# Patient Record
Sex: FEMALE | Race: WHITE | NOT HISPANIC OR LATINO | URBAN - METROPOLITAN AREA
[De-identification: names, ages, dates, MRNs, and addresses within clinical notes are randomized per-mention and may not be internally consistent; named-entity substitution may affect disease eponyms.]

---

## 2017-04-18 ENCOUNTER — OFFICE VISIT - RIVER FALLS (OUTPATIENT)
Dept: FAMILY MEDICINE | Facility: CLINIC | Age: 22
End: 2017-04-18

## 2017-04-20 ENCOUNTER — OFFICE VISIT - RIVER FALLS (OUTPATIENT)
Dept: FAMILY MEDICINE | Facility: CLINIC | Age: 22
End: 2017-04-20

## 2017-09-13 ENCOUNTER — OFFICE VISIT - RIVER FALLS (OUTPATIENT)
Dept: FAMILY MEDICINE | Facility: CLINIC | Age: 22
End: 2017-09-13

## 2017-09-13 ASSESSMENT — MIFFLIN-ST. JEOR: SCORE: 1286.87

## 2017-09-14 LAB
CREAT SERPL-MCNC: 0.62 MG/DL (ref 0.5–1.1)
GLUCOSE BLD-MCNC: 84 MG/DL (ref 65–99)

## 2017-11-29 ENCOUNTER — OFFICE VISIT - RIVER FALLS (OUTPATIENT)
Dept: FAMILY MEDICINE | Facility: CLINIC | Age: 22
End: 2017-11-29

## 2017-11-29 ASSESSMENT — MIFFLIN-ST. JEOR: SCORE: 1292.31

## 2017-12-12 ENCOUNTER — OFFICE VISIT - RIVER FALLS (OUTPATIENT)
Dept: FAMILY MEDICINE | Facility: CLINIC | Age: 22
End: 2017-12-12

## 2017-12-27 ENCOUNTER — OFFICE VISIT - RIVER FALLS (OUTPATIENT)
Dept: FAMILY MEDICINE | Facility: CLINIC | Age: 22
End: 2017-12-27

## 2017-12-27 ASSESSMENT — MIFFLIN-ST. JEOR: SCORE: 1299.57

## 2018-01-18 ENCOUNTER — OFFICE VISIT - RIVER FALLS (OUTPATIENT)
Dept: FAMILY MEDICINE | Facility: CLINIC | Age: 23
End: 2018-01-18

## 2018-01-18 ASSESSMENT — MIFFLIN-ST. JEOR: SCORE: 1322.25

## 2018-01-19 ENCOUNTER — OFFICE VISIT - RIVER FALLS (OUTPATIENT)
Dept: FAMILY MEDICINE | Facility: CLINIC | Age: 23
End: 2018-01-19

## 2018-01-19 ASSESSMENT — MIFFLIN-ST. JEOR: SCORE: 1322.25

## 2020-09-20 ENCOUNTER — HOSPITAL ENCOUNTER (EMERGENCY)
Facility: CLINIC | Age: 25
Discharge: HOME | End: 2020-09-20
Attending: FAMILY MEDICINE | Admitting: FAMILY MEDICINE
Payer: COMMERCIAL

## 2020-09-20 ENCOUNTER — TELEPHONE (OUTPATIENT)
Dept: BEHAVIORAL HEALTH | Facility: CLINIC | Age: 25
End: 2020-09-20

## 2020-09-20 VITALS
SYSTOLIC BLOOD PRESSURE: 126 MMHG | RESPIRATION RATE: 16 BRPM | TEMPERATURE: 97.8 F | DIASTOLIC BLOOD PRESSURE: 88 MMHG | HEART RATE: 73 BPM | OXYGEN SATURATION: 95 %

## 2020-09-20 DIAGNOSIS — F42.9 OBSESSIVE-COMPULSIVE DISORDER, UNSPECIFIED TYPE: ICD-10-CM

## 2020-09-20 DIAGNOSIS — F32.A DEPRESSION, UNSPECIFIED DEPRESSION TYPE: ICD-10-CM

## 2020-09-20 DIAGNOSIS — R45.851 SUICIDAL IDEATION: ICD-10-CM

## 2020-09-20 LAB
ALCOHOL BREATH TEST: 0.04 (ref 0–0.01)
AMPHETAMINES UR QL SCN: NEGATIVE
BARBITURATES UR QL: NEGATIVE
BENZODIAZ UR QL: NEGATIVE
CANNABINOIDS UR QL SCN: NEGATIVE
COCAINE UR QL: NEGATIVE
ETHANOL UR QL SCN: POSITIVE
HCG UR QL: NEGATIVE
LABORATORY COMMENT REPORT: NORMAL
OPIATES UR QL SCN: NEGATIVE
SARS-COV-2 RNA SPEC QL NAA+PROBE: NEGATIVE
SARS-COV-2 RNA SPEC QL NAA+PROBE: NORMAL
SPECIMEN SOURCE: NORMAL
SPECIMEN SOURCE: NORMAL

## 2020-09-20 PROCEDURE — C9803 HOPD COVID-19 SPEC COLLECT: HCPCS | Performed by: FAMILY MEDICINE

## 2020-09-20 PROCEDURE — 90791 PSYCH DIAGNOSTIC EVALUATION: CPT

## 2020-09-20 PROCEDURE — 80307 DRUG TEST PRSMV CHEM ANLYZR: CPT | Performed by: FAMILY MEDICINE

## 2020-09-20 PROCEDURE — 81025 URINE PREGNANCY TEST: CPT | Performed by: FAMILY MEDICINE

## 2020-09-20 PROCEDURE — U0003 INFECTIOUS AGENT DETECTION BY NUCLEIC ACID (DNA OR RNA); SEVERE ACUTE RESPIRATORY SYNDROME CORONAVIRUS 2 (SARS-COV-2) (CORONAVIRUS DISEASE [COVID-19]), AMPLIFIED PROBE TECHNIQUE, MAKING USE OF HIGH THROUGHPUT TECHNOLOGIES AS DESCRIBED BY CMS-2020-01-R: HCPCS | Performed by: FAMILY MEDICINE

## 2020-09-20 PROCEDURE — 99285 EMERGENCY DEPT VISIT HI MDM: CPT | Mod: Z6 | Performed by: FAMILY MEDICINE

## 2020-09-20 PROCEDURE — 80320 DRUG SCREEN QUANTALCOHOLS: CPT | Performed by: FAMILY MEDICINE

## 2020-09-20 PROCEDURE — 99285 EMERGENCY DEPT VISIT HI MDM: CPT | Mod: 25 | Performed by: FAMILY MEDICINE

## 2020-09-20 PROCEDURE — 25000128 H RX IP 250 OP 636: Performed by: FAMILY MEDICINE

## 2020-09-20 RX ORDER — ONDANSETRON 4 MG/1
4 TABLET, ORALLY DISINTEGRATING ORAL ONCE
Status: COMPLETED | OUTPATIENT
Start: 2020-09-20 | End: 2020-09-20

## 2020-09-20 RX ORDER — NORGESTIMATE AND ETHINYL ESTRADIOL 0.25-0.035
1 KIT ORAL DAILY
COMMUNITY
Start: 2020-08-31 | End: 2021-08-07

## 2020-09-20 RX ORDER — FLUOXETINE 40 MG/1
40 CAPSULE ORAL DAILY
COMMUNITY
Start: 2018-10-08 | End: 2021-08-07

## 2020-09-20 RX ORDER — GABAPENTIN 300 MG/1
300 CAPSULE ORAL
COMMUNITY
Start: 2018-10-08 | End: 2021-08-07

## 2020-09-20 RX ADMIN — ONDANSETRON 4 MG: 4 TABLET, ORALLY DISINTEGRATING ORAL at 14:07

## 2020-09-20 ASSESSMENT — ENCOUNTER SYMPTOMS
NERVOUS/ANXIOUS: 1
APPETITE CHANGE: 1
PALPITATIONS: 1

## 2020-09-20 NOTE — ED PROVIDER NOTES
ED Provider Note  [facility]      History     Chief Complaint   Patient presents with     Anxiety     Obsessive Compulsive Disorder     The history is provided by the patient, medical records and the spouse.     Yolie Ware is a 24 year old female with a history of OCD who presents to the Emergency Department with anxiety. Patient was diagnosed with OCD 3 years ago with obsessions around getting sick. COVID-19 has now increased her OCD symptoms. She has obsessive thinking and worry related to COVID. The beginning of flu season has further escalated these symptoms. Patient has racing, ruminating thoughts, decreased appetite, increased heart rate. Patient also reports suicidal ideation but denies intent. She states she is fearful to go home because she is unsure what she will do. She states there are guns in the home. Patient reports alcohol use to medicate symptoms.     Past Medical History  History reviewed. No pertinent past medical history.  History reviewed. No pertinent surgical history.  FLUoxetine (PROZAC) 40 MG capsule  gabapentin (NEURONTIN) 300 MG capsule  norgestimate-ethinyl estradiol (ORTHO-CYCLEN) 0.25-35 MG-MCG tablet      No Known Allergies  Family History  No family history on file.  Social History   Social History     Tobacco Use     Smoking status: Never Smoker     Smokeless tobacco: Never Used   Substance Use Topics     Alcohol use: Yes     Comment: rarely     Drug use: Not Currently      Past medical history, past surgical history, medications, allergies, family history, and social history were reviewed with the patient. No additional pertinent items.       Review of Systems   Constitutional: Positive for appetite change (decreased).   Cardiovascular: Positive for palpitations.   Psychiatric/Behavioral: Positive for suicidal ideas. The patient is nervous/anxious.    All other systems reviewed and are negative.    A complete review of systems was performed with  pertinent positives and negatives noted in the HPI, and all other systems negative.    Physical Exam   BP: 123/85  Pulse: 78  Temp: 96.7  F (35.9  C)  Resp: 16  SpO2: 98 %  Physical Exam  Constitutional:       General: She is not in acute distress.     Appearance: She is not diaphoretic.   HENT:      Head: Atraumatic.      Mouth/Throat:      Pharynx: No oropharyngeal exudate.   Eyes:      General: No scleral icterus.     Pupils: Pupils are equal, round, and reactive to light.   Cardiovascular:      Heart sounds: Normal heart sounds.   Pulmonary:      Effort: No respiratory distress.      Breath sounds: Normal breath sounds.   Abdominal:      General: Bowel sounds are normal.      Palpations: Abdomen is soft.      Tenderness: There is no abdominal tenderness.   Musculoskeletal:         General: No tenderness.   Skin:     General: Skin is warm.      Findings: No rash.   Neurological:      General: No focal deficit present.      Mental Status: She is oriented to person, place, and time.      Motor: No weakness.      Coordination: Coordination normal.   Psychiatric:         Mood and Affect: Mood is anxious and depressed.       ED Course      Procedures          Patient was seen and evaluated by the  please refer to her documentation in the note section of the epic chart dated 9/20/2020           Results for orders placed or performed during the hospital encounter of 09/20/20   HCG qualitative urine     Status: None   Result Value Ref Range    HCG Qual Urine Negative NEG^Negative   Drug abuse screen 6 urine (tox)     Status: Abnormal   Result Value Ref Range    Amphetamine Qual Urine Negative NEG^Negative    Barbiturates Qual Urine Negative NEG^Negative    Benzodiazepine Qual Urine Negative NEG^Negative    Cannabinoids Qual Urine Negative NEG^Negative    Cocaine Qual Urine Negative NEG^Negative    Ethanol Qual Urine Positive (A) NEG^Negative    Opiates Qualitative Urine Negative NEG^Negative   Asymptomatic  COVID-19 Virus (Coronavirus) by PCR     Status: None    Specimen: Nasopharyngeal   Result Value Ref Range    COVID-19 Virus PCR to [facility] - Source Nasopharyngeal     COVID-19 Virus PCR to [facility] - Result       Test received-See reflex to IDDL test SARS CoV2 (COVID-19) Virus RT-PCR   SARS-CoV-2 COVID-19 Virus (Coronavirus) RT-PCR Nasopharyngeal     Status: None    Specimen: Nasopharyngeal   Result Value Ref Range    SARS-CoV-2 Virus Specimen Source Nasopharyngeal     SARS-CoV-2 PCR Result NEGATIVE     SARS-CoV-2 PCR Comment       Testing was performed using the [employer]plex[employer] COVID-19 Direct Assay on the [employer] MDX   instrument. Additional information about this Emergency Use Authorization (EUA) assay can   be found via the Lab Guide.     Alcohol breath test POCT     Status: Abnormal   Result Value Ref Range    Alcohol Breath Test 0.037 (A) 0.00 - 0.01     Medications   ondansetron (ZOFRAN-ODT) ODT tab 4 mg (4 mg Oral Given 9/20/20 1407)        Assessments & Plan (with Medical Decision Making)       I have reviewed the nursing notes. I have reviewed the findings, diagnosis, plan and need for follow up with the patient.    Patient with history of OCD and depression has had some suicidal ideation at this time initially we offered inpatient admission however after further discussion patient and patient's family members have agreed that they would rather try and manage this as an outpatient basis they are removing the guns from the home additional family members are coming to stay for safety and patient will start with an intake at [facility] tomorrow for their PHP program they are understand the need to return if there is marked increase in risk otherwise they will follow-up with the outpatient programs as discussed.    Final diagnoses:   Obsessive-compulsive disorder, unspecified type   Depression, unspecified depression type   Suicidal ideation     I, Alcira Vallecillo, am serving as a trained medical  scribe to document services personally performed by Gianluca Juan MD, based on the provider's statements to me.      I, Gianluca Juan MD, was physically present and have reviewed and verified the accuracy of this note documented by Alcira Vallecillo    --  Gianluca Juan MD  [facility], [place], EMERGENCY DEPARTMENT  9/20/2020     Gianluca Juan MD  09/20/20 4004

## 2020-09-20 NOTE — DISCHARGE INSTRUCTIONS
Patient discharging to home with family member with safety steps including removal of guns.  Intake with [facility] tomorrow and additional family members will be staying with him at home to maintain safety.  Both are agreeing to return to the emergency room if there is any increase in risk.

## 2020-09-20 NOTE — PHARMACY-ADMISSION MEDICATION HISTORY
Admission Medication History Completed by Pharmacy    See [facility] Admission Navigator for allergy information, preferred outpatient pharmacy, prior to admission medications and immunization status.     Medication History Sources:     The patient, [facility]    Changes made to PTA medication list (reason):    Added: None    Deleted: None    Changed: None    Additional Information:    The patient is a good medication historian. She knows the medications she is currently taking along with their strengths.    The patient denies taking any other medications.    Prior to Admission medications    Medication Sig Last Dose Taking? Auth Provider   FLUoxetine (PROZAC) 40 MG capsule Take 40 mg by mouth daily 9/19/2020 at Unknown time Yes Reported, Patient   gabapentin (NEURONTIN) 300 MG capsule Take 300 mg by mouth nightly as needed 9/19/2020 at Unknown time Yes Reported, Patient   norgestimate-ethinyl estradiol (ORTHO-CYCLEN) 0.25-35 MG-MCG tablet Take 1 tablet by mouth daily 9/19/2020 at Unknown time Yes Reported, Patient       Date completed: 09/20/20    Medication history completed by: Shirley Vazquez

## 2020-09-20 NOTE — TELEPHONE ENCOUNTER
Patient cleared and ready for behavioral bed placement: Yes    S Pt is a 24 year old female at the [place] ed    B Pt has histroy of OCD and ETOH abuse. Pt is not having many compulsive behaviors but obsessive thinking to getting sick. Pt has gotten worse in thinking since covid19. Pt appetite decreased. Pt has increased worse thinking. Pt has passive si no plan but cannot commit to safety outside the hospital. Pt went to a wedding last night. Pt ETOH level is 0.037.  feels pt is better for mental health compared to MICD on 3aw. Pt does see outpatient providers. Pt denies medical concerns. Pt is asymptomatic for covid19. Pt denies history of aggression and psychosis.     A Vol    R 4aw/Cayla/Rogers    - 118PM ED attempting to collect covid19  - unit and ed aware of admission 4pm covid19 is collected

## 2020-09-20 NOTE — ED TRIAGE NOTES
Patient reports anxiety and OCD getting worst since the pandemic started and now with the flu season coming her anxiety and OCD and getting to a point where she cont control it. Patient currently taking Prozac and Gabapentin.

## 2020-09-20 NOTE — ED AVS SNAPSHOT
[facility], [place], Emergency Department  [address]  [facility] [address]  Phone:  [phone]  Fax:  [phone]                                    Yolie Ware   MRN: [id number]    Department:  [facility], Emergency Department   Date of Visit:  9/20/2020           After Visit Summary Signature Page    I have received my discharge instructions, and my questions have been answered. I have discussed any challenges I see with this plan with the nurse or doctor.    ..........................................................................................................................................  Patient/Patient Representative Signature      ..........................................................................................................................................  Patient Representative Print Name and Relationship to Patient    ..................................................               ................................................  Date                                   Time    ..........................................................................................................................................  Reviewed by Signature/Title    ...................................................              ..............................................  Date                                               Time          22EPIC Rev 08/18

## 2021-08-07 ENCOUNTER — HOSPITAL ENCOUNTER (INPATIENT)
Facility: CLINIC | Age: 26
LOS: 1 days | Discharge: LEFT BEFORE DISCHARGE | DRG: 832 | End: 2021-08-08
Attending: EMERGENCY MEDICINE | Admitting: PSYCHIATRY & NEUROLOGY
Payer: COMMERCIAL

## 2021-08-07 ENCOUNTER — TELEPHONE (OUTPATIENT)
Dept: BEHAVIORAL HEALTH | Facility: CLINIC | Age: 26
End: 2021-08-07

## 2021-08-07 DIAGNOSIS — F32.A ANXIETY AND DEPRESSION: ICD-10-CM

## 2021-08-07 DIAGNOSIS — Z20.822 CONTACT WITH AND (SUSPECTED) EXPOSURE TO COVID-19: ICD-10-CM

## 2021-08-07 DIAGNOSIS — O99.342 PREGNANCY WITH MENTAL DISORDERS, SECOND TRIMESTER: ICD-10-CM

## 2021-08-07 DIAGNOSIS — O99.312: ICD-10-CM

## 2021-08-07 DIAGNOSIS — F33.9 RECURRENT MAJOR DEPRESSIVE DISORDER, REMISSION STATUS UNSPECIFIED (H): ICD-10-CM

## 2021-08-07 DIAGNOSIS — F42.9 OBSESSIVE-COMPULSIVE DISORDER, UNSPECIFIED TYPE: ICD-10-CM

## 2021-08-07 DIAGNOSIS — Z3A.15 15 WEEKS GESTATION OF PREGNANCY: ICD-10-CM

## 2021-08-07 DIAGNOSIS — F41.9 ANXIETY AND DEPRESSION: ICD-10-CM

## 2021-08-07 DIAGNOSIS — F10.10 ALCOHOL ABUSE: ICD-10-CM

## 2021-08-07 DIAGNOSIS — F41.9 ANXIETY DISORDER, UNSPECIFIED TYPE: ICD-10-CM

## 2021-08-07 LAB
ALBUMIN SERPL-MCNC: 3.2 G/DL (ref 3.4–5)
ALCOHOL BREATH TEST: 0 (ref 0–0.01)
ALP SERPL-CCNC: 53 U/L (ref 40–150)
ALT SERPL W P-5'-P-CCNC: 26 U/L (ref 0–50)
AMPHETAMINES UR QL SCN: NORMAL
ANION GAP SERPL CALCULATED.3IONS-SCNC: 5 MMOL/L (ref 3–14)
AST SERPL W P-5'-P-CCNC: 17 U/L (ref 0–45)
BARBITURATES UR QL: NORMAL
BASOPHILS # BLD AUTO: 0 10E3/UL (ref 0–0.2)
BASOPHILS NFR BLD AUTO: 0 %
BENZODIAZ UR QL: NORMAL
BILIRUB SERPL-MCNC: 0.2 MG/DL (ref 0.2–1.3)
BUN SERPL-MCNC: 10 MG/DL (ref 7–30)
CALCIUM SERPL-MCNC: 8.7 MG/DL (ref 8.5–10.1)
CANNABINOIDS UR QL SCN: NORMAL
CHLORIDE BLD-SCNC: 109 MMOL/L (ref 94–109)
CO2 SERPL-SCNC: 25 MMOL/L (ref 20–32)
COCAINE UR QL: NORMAL
CREAT SERPL-MCNC: 0.56 MG/DL (ref 0.52–1.04)
EOSINOPHIL # BLD AUTO: 0.1 10E3/UL (ref 0–0.7)
EOSINOPHIL NFR BLD AUTO: 1 %
ERYTHROCYTE [DISTWIDTH] IN BLOOD BY AUTOMATED COUNT: 12 % (ref 10–15)
GFR SERPL CREATININE-BSD FRML MDRD: >90 ML/MIN/1.73M2
GLUCOSE BLD-MCNC: 85 MG/DL (ref 70–99)
HCT VFR BLD AUTO: 34.1 % (ref 35–47)
HGB BLD-MCNC: 11.8 G/DL (ref 11.7–15.7)
HOLD SPECIMEN: NORMAL
HOLD SPECIMEN: NORMAL
IMM GRANULOCYTES # BLD: 0 10E3/UL
IMM GRANULOCYTES NFR BLD: 0 %
LYMPHOCYTES # BLD AUTO: 2.1 10E3/UL (ref 0.8–5.3)
LYMPHOCYTES NFR BLD AUTO: 20 %
MCH RBC QN AUTO: 31.8 PG (ref 26.5–33)
MCHC RBC AUTO-ENTMCNC: 34.6 G/DL (ref 31.5–36.5)
MCV RBC AUTO: 92 FL (ref 78–100)
MONOCYTES # BLD AUTO: 1.1 10E3/UL (ref 0–1.3)
MONOCYTES NFR BLD AUTO: 11 %
NEUTROPHILS # BLD AUTO: 6.9 10E3/UL (ref 1.6–8.3)
NEUTROPHILS NFR BLD AUTO: 68 %
NRBC # BLD AUTO: 0 10E3/UL
NRBC BLD AUTO-RTO: 0 /100
OPIATES UR QL SCN: NORMAL
PLATELET # BLD AUTO: 296 10E3/UL (ref 150–450)
POTASSIUM BLD-SCNC: 3.9 MMOL/L (ref 3.4–5.3)
PROT SERPL-MCNC: 6.6 G/DL (ref 6.8–8.8)
RBC # BLD AUTO: 3.71 10E6/UL (ref 3.8–5.2)
SARS-COV-2 RNA RESP QL NAA+PROBE: NEGATIVE
SODIUM SERPL-SCNC: 139 MMOL/L (ref 133–144)
WBC # BLD AUTO: 10.3 10E3/UL (ref 4–11)

## 2021-08-07 PROCEDURE — U0003 INFECTIOUS AGENT DETECTION BY NUCLEIC ACID (DNA OR RNA); SEVERE ACUTE RESPIRATORY SYNDROME CORONAVIRUS 2 (SARS-COV-2) (CORONAVIRUS DISEASE [COVID-19]), AMPLIFIED PROBE TECHNIQUE, MAKING USE OF HIGH THROUGHPUT TECHNOLOGIES AS DESCRIBED BY CMS-2020-01-R: HCPCS | Performed by: EMERGENCY MEDICINE

## 2021-08-07 PROCEDURE — 82075 ASSAY OF BREATH ETHANOL: CPT | Performed by: EMERGENCY MEDICINE

## 2021-08-07 PROCEDURE — 36415 COLL VENOUS BLD VENIPUNCTURE: CPT | Performed by: EMERGENCY MEDICINE

## 2021-08-07 PROCEDURE — 99285 EMERGENCY DEPT VISIT HI MDM: CPT | Mod: 25 | Performed by: EMERGENCY MEDICINE

## 2021-08-07 PROCEDURE — 80053 COMPREHEN METABOLIC PANEL: CPT | Performed by: EMERGENCY MEDICINE

## 2021-08-07 PROCEDURE — 80307 DRUG TEST PRSMV CHEM ANLYZR: CPT | Performed by: EMERGENCY MEDICINE

## 2021-08-07 PROCEDURE — 124N000002 HC R&B MH UMMC

## 2021-08-07 PROCEDURE — 99285 EMERGENCY DEPT VISIT HI MDM: CPT | Performed by: EMERGENCY MEDICINE

## 2021-08-07 PROCEDURE — 85025 COMPLETE CBC W/AUTO DIFF WBC: CPT | Performed by: EMERGENCY MEDICINE

## 2021-08-07 PROCEDURE — C9803 HOPD COVID-19 SPEC COLLECT: HCPCS | Performed by: EMERGENCY MEDICINE

## 2021-08-07 PROCEDURE — 90791 PSYCH DIAGNOSTIC EVALUATION: CPT

## 2021-08-07 RX ORDER — ONDANSETRON 4 MG/1
4 TABLET, ORALLY DISINTEGRATING ORAL EVERY 8 HOURS PRN
COMMUNITY

## 2021-08-07 RX ORDER — MAGNESIUM HYDROXIDE/ALUMINUM HYDROXICE/SIMETHICONE 120; 1200; 1200 MG/30ML; MG/30ML; MG/30ML
30 SUSPENSION ORAL EVERY 4 HOURS PRN
Status: DISCONTINUED | OUTPATIENT
Start: 2021-08-07 | End: 2021-08-08 | Stop reason: HOSPADM

## 2021-08-07 RX ORDER — PRENATAL VIT/IRON FUM/FOLIC AC 27MG-0.8MG
1 TABLET ORAL DAILY
COMMUNITY

## 2021-08-07 RX ORDER — ACETAMINOPHEN 325 MG/1
650 TABLET ORAL EVERY 4 HOURS PRN
Status: DISCONTINUED | OUTPATIENT
Start: 2021-08-07 | End: 2021-08-08 | Stop reason: HOSPADM

## 2021-08-07 RX ORDER — HYDROXYZINE HYDROCHLORIDE 10 MG/1
10 TABLET, FILM COATED ORAL 2 TIMES DAILY PRN
Status: DISCONTINUED | OUTPATIENT
Start: 2021-08-07 | End: 2021-08-08 | Stop reason: HOSPADM

## 2021-08-07 RX ORDER — ONDANSETRON 4 MG/1
4 TABLET, ORALLY DISINTEGRATING ORAL EVERY 8 HOURS PRN
Status: DISCONTINUED | OUTPATIENT
Start: 2021-08-07 | End: 2021-08-08 | Stop reason: HOSPADM

## 2021-08-07 RX ORDER — HYDROXYZINE HYDROCHLORIDE 10 MG/1
10 TABLET, FILM COATED ORAL 2 TIMES DAILY PRN
COMMUNITY

## 2021-08-07 RX ORDER — ONDANSETRON 2 MG/ML
4 INJECTION INTRAMUSCULAR; INTRAVENOUS ONCE
Status: DISCONTINUED | OUTPATIENT
Start: 2021-08-07 | End: 2021-08-07 | Stop reason: CLARIF

## 2021-08-07 RX ORDER — PRENATAL VIT/IRON FUM/FOLIC AC 27MG-0.8MG
1 TABLET ORAL DAILY
Status: DISCONTINUED | OUTPATIENT
Start: 2021-08-08 | End: 2021-08-08 | Stop reason: HOSPADM

## 2021-08-07 ASSESSMENT — ENCOUNTER SYMPTOMS
ABDOMINAL PAIN: 0
FLANK PAIN: 0
FEVER: 0
NECK PAIN: 0
HEADACHES: 0
NAUSEA: 1
SHORTNESS OF BREATH: 0
DYSPHORIC MOOD: 1
VOMITING: 0
MUSCULOSKELETAL NEGATIVE: 1
EYES NEGATIVE: 1

## 2021-08-07 ASSESSMENT — ACTIVITIES OF DAILY LIVING (ADL)
TOILETING_ISSUES: NO
DIFFICULTY_COMMUNICATING: NO
DOING_ERRANDS_INDEPENDENTLY_DIFFICULTY: NO
WALKING_OR_CLIMBING_STAIRS_DIFFICULTY: NO
WEAR_GLASSES_OR_BLIND: YES
VISION_MANAGEMENT: GLASSES
CONCENTRATING,_REMEMBERING_OR_MAKING_DECISIONS_DIFFICULTY: YES
FALL_HISTORY_WITHIN_LAST_SIX_MONTHS: NO
DIFFICULTY_EATING/SWALLOWING: NO
DRESSING/BATHING_DIFFICULTY: NO

## 2021-08-07 ASSESSMENT — MIFFLIN-ST. JEOR: SCORE: 1400.39

## 2021-08-07 NOTE — SAFE
Yolie Ware  August 7, 2021    Critical Safety Issues: 15 weeks pregnant      Current Suicidal Ideation/Self-Injurious Concerns/Methods: Other alcohol or reckless driving      Current or Historical Inappropriate Sexual Behavior: No      Current or Historical Aggression/Homicidal Ideation: None - N/A      Triggers: Alcohol use triggers suicidal thoughts.    Updated care team: Yes: Dr. Dallas    For additional details see full [facility] assessment.       LUAN Castro

## 2021-08-07 NOTE — TELEPHONE ENCOUNTER
S: Pt is a 25 yrs old female in the [place] ED for SI with a plan, reports by Isabell at 4:49PM.    B: Pt is 15 weeks pregnant.  She s been drinking, and reporting SI with vague thoughts of driving somewhere and drink herself to death.  Pt has never attempted suicide.  Hx of MDD, OCD, and alcohol use d/o.  Her OCD is severe with obsessive compulsive thinking with negative thoughts of people talking about her, having opinions about her.  Pt had some health obsessions.  Pt reports that all coping skills are not working and having SI in the last 6 months.  Pt reports drinking once every 1-2 weeks, greater than 7 glasses of wine/seltzer.  KEANU was lastnight and she drank hard liquor  She reported that she drank 3 times this week.  Hx of cutting in her teens.  Pt is seen in the [facility] program at [facility].  That doctor took her off Gabapentin and Prozac, and put her Vistara.  Pt has pretty severe morning sickness, vistara helped for only 2 days.  Not able to eat or drink.  Sleep is okay, energy is low.  Pt has been missing work.  Not sure if she is a angy or OCD.  Pt reports feeling hopelessness, worthlessness, guilt, lost of interest.      Pt has an outpt psychiatrist and therapist at [facility].      No chronic medical illness.  Walking independently.  No symptoms of COVID.   Breathalyzer: 0  UTOX: negative  COVID: negative.   CBC: WNL  CMP: WNL    A: Vol.     R: 6:32PM- Dr. Guaman accepts for 30/Uspensky.          Patient cleared and ready for behavioral bed placement: Yes

## 2021-08-07 NOTE — ED PROVIDER NOTES
[facility] EMERGENCY DEPARTMENT ([facility])    21     ED 15   2:34 PM   History     Chief Complaint   Patient presents with     Suicidal     Pt 15 weeks pregnant, seeking detox from alcohol, SI (drinking as a way to harm myself)      Addiction Problem     The history is provided by the patient, medical records and a significant other (Octaviano - boyiend).     Yolie Ware is a 25 year old  pregnant female with history of depression, anxiety and OCD approximately 15 weeks gestation who presents seeking assistance with sobriety as well as suicidal ideation.  Boyfriend states that patient has had multiple stressors.  She  has gotten  and is in the process of selling her house, and has had interpersonal conflict with her friends who have been disagreeing with her choices.  Patient has been turning to drinking to help cope with her many issues, particularly in a binge drinking pattern.  Boyfriend states that she has been engaging in binge drinking more frequently and had 3 binges in the past week.  He states that she has been concealing her drinking.  She has been doing day treatment through the Mother Baby Program at [facility], has sessions for 4 hours a day, 5 times a week.  In spite of this she has had worsening depression, suicidal ideation and worsening alcohol use.  She feels that she needs more intensive cares at this time and so presents for evaluation.  She notes that she has been engaging in more dangerous behaviors as she gets more suicidal when she is drinking.  She has driven off in her car while intoxicated, drinking while driving and thinking of ways of killing herself.  Patient drank again last night.  She is concerned about the health of her fetus.  She feels that her drinking has been triggered by her eroding mental health.  She does not think she is physically dependent on alcohol the is psychologically dependent. No history of alcohol withdrawal seizures.  Patient  would like to get into inpatient treatment, feels it is the only way she can successfully stop drinking.  She is unsure of whether her issues start with mental health versus addiction, does not know where to go or what to do and so presents for evaluation.  She is a non-smoker, no marijuana use.  She has had her COVID-19 vaccination. No vaginal bleeding or vaginal discharge. Has morning sickness, takes antiemetics for this.    Social history: accompanied by boyfriend Octaviano.     I have reviewed the Medications, Allergies, Past Medical and Surgical History, and Social History in the Epic system.  No past medical history on file.    No past surgical history on file.    Past medical history, past surgical history, medications, and allergies were reviewed with the patient. Additional pertinent items: None    No family history on file.    Social History     Tobacco Use     Smoking status: Never Smoker     Smokeless tobacco: Never Used   Substance Use Topics     Alcohol use: Yes     Comment: rarely        Social history was reviewed with the patient. Additional pertinent items: None    Review of Systems   Constitutional: Negative for fever.   Eyes: Negative.    Respiratory: Negative for shortness of breath.    Cardiovascular: Negative for chest pain.   Gastrointestinal: Positive for nausea. Negative for abdominal pain and vomiting.   Genitourinary: Negative for flank pain and vaginal bleeding.   Musculoskeletal: Negative.  Negative for neck pain.   Skin: Negative for rash.   Neurological: Negative for headaches.   Psychiatric/Behavioral: Positive for dysphoric mood and suicidal ideas.   All other systems reviewed and are negative.          Physical Exam   BP: 124/79  Pulse: 82  Temp: 97.7  F (36.5  C)  Resp: 16  SpO2: 99 %    Physical Exam    Physical Exam   Constitutional:   well nourished, well developed, resting comfortably   HENT:   Head: Normocephalic and atraumatic.   Eyes: Conjunctivae are normal. Pupils are equal,  round, and reactive to light.   Neck:   no adenopathy, no bony tenderness  Cardiovascular: regular rate and rhythm without murmurs or gallops  Pulmonary/Chest: Clear to auscultation bilaterally, with no wheezes or retractions. No respiratory distress.  GI: Soft with good bowel sounds.  Non-tender, non-distended, with no guarding, no rebound, no peritoneal signs.   Back:  No bony or CVA tenderness   Musculoskeletal:  no edema or clubbing   Skin: Skin is warm and dry. No rash noted.   Neurological: alert and oriented to person, place, and time. Nonfocal exam  Psychiatric:  Speech is normal. Judgment and thought content normal. mood appears normal. Patient is not agitated, not aggressive, not hyperactive, not actively hallucinating and not combative. Thought content is not paranoid and not delusional. Cognition and memory are normal. No homicidal and no suicidal ideation.     ED Course        Procedures                    The medical record was reviewed and interpreted.  Current labs reviewed and interpreted.        Results for orders placed or performed during the hospital encounter of 08/07/21 (from the past 24 hour(s))   Urine Drugs of Abuse Screen    Narrative    The following orders were created for panel order Urine Drugs of Abuse Screen.  Procedure                               Abnormality         Status                     ---------                               -----------         ------                     Drug abuse screen 1 urin...[[id number]]                                                   Please view results for these tests on the individual orders.   CBC with platelets differential    Narrative    The following orders were created for panel order CBC with platelets differential.  Procedure                               Abnormality         Status                     ---------                               -----------         ------                     CBC with platelets and d...[[id number]]  Abnormal             Final result                 Please view results for these tests on the individual orders.   Comprehensive metabolic panel   Result Value Ref Range    Sodium 139 133 - 144 mmol/L    Potassium 3.9 3.4 - 5.3 mmol/L    Chloride 109 94 - 109 mmol/L    Carbon Dioxide (CO2) 25 20 - 32 mmol/L    Anion Gap 5 3 - 14 mmol/L    Urea Nitrogen 10 7 - 30 mg/dL    Creatinine 0.56 0.52 - 1.04 mg/dL    Calcium 8.7 8.5 - 10.1 mg/dL    Glucose 85 70 - 99 mg/dL    Alkaline Phosphatase 53 40 - 150 U/L    AST 17 0 - 45 U/L    ALT 26 0 - 50 U/L    Protein Total 6.6 (L) 6.8 - 8.8 g/dL    Albumin 3.2 (L) 3.4 - 5.0 g/dL    Bilirubin Total 0.2 0.2 - 1.3 mg/dL    GFR Estimate >90 >60 mL/min/1.73m2   CBC with platelets and differential   Result Value Ref Range    WBC Count 10.3 4.0 - 11.0 10e3/uL    RBC Count 3.71 (L) 3.80 - 5.20 10e6/uL    Hemoglobin 11.8 11.7 - 15.7 g/dL    Hematocrit 34.1 (L) 35.0 - 47.0 %    MCV 92 78 - 100 fL    MCH 31.8 26.5 - 33.0 pg    MCHC 34.6 31.5 - 36.5 g/dL    RDW 12.0 10.0 - 15.0 %    Platelet Count 296 150 - 450 10e3/uL    % Neutrophils 68 %    % Lymphocytes 20 %    % Monocytes 11 %    % Eosinophils 1 %    % Basophils 0 %    % Immature Granulocytes 0 %    NRBCs per 100 WBC 0 <1 /100    Absolute Neutrophils 6.9 1.6 - 8.3 10e3/uL    Absolute Lymphocytes 2.1 0.8 - 5.3 10e3/uL    Absolute Monocytes 1.1 0.0 - 1.3 10e3/uL    Absolute Eosinophils 0.1 0.0 - 0.7 10e3/uL    Absolute Basophils 0.0 0.0 - 0.2 10e3/uL    Absolute Immature Granulocytes 0.0 <=0.0 10e3/uL    Absolute NRBCs 0.0 10e3/uL   Asymptomatic COVID-19 Virus (Coronavirus) by PCR Nasopharyngeal    Specimen: Nasopharyngeal; Swab    Narrative    The following orders were created for panel order Asymptomatic COVID-19 Virus (Coronavirus) by PCR Nasopharyngeal.  Procedure                               Abnormality         Status                     ---------                               -----------         ------                     SARS-COV2 (COVID-19)  Vir...[[id number]]                      In process                   Please view results for these tests on the individual orders.   Alcohol breath test POCT   Result Value Ref Range    Alcohol Breath Test 0.00 0.00 - 0.01   Extra Tube    Narrative    The following orders were created for panel order Extra Tube.  Procedure                               Abnormality         Status                     ---------                               -----------         ------                     Extra Blue Top Tube[[id number]]                              In process                 Extra Red Top Tube[[id number]]                               In process                   Please view results for these tests on the individual orders.     Medications   ondansetron (ZOFRAN) injection 4 mg (has no administration in time range)              Assessments & Plan (with Medical Decision Making)       I have reviewed the nursing notes.  EMERGENCY DEPARTMENT COURSE: Patient was seen and examined at 1432 pm.     Breathalyzer is 0.00.  Laboratory studies show an essentially unremarkable CBC and comprehensive metabolic panel.  Total protein and albumin are low.  Urine tox screen is positive doing.  I had ordered fetal heart tones, which are also pending.  The patient is a 25-year-old female with a history of anxiety depression who is 15 weeks pregnant and has found herself binge drinking alcohol.  She is seeking inpatient treatment.  She will be [facility] by the [facility] .  I have requested an inpatient mental health bed for her.  The patient was signed out for back evaluation to Dr. Dallas at approximately 1600 pm.  I have reviewed the findings, diagnosis, plan and need for follow up with the patient.    New Prescriptions    No medications on file       Final diagnoses:   Anxiety and depression   Alcohol abuse   15 weeks gestation of pregnancy       I, Faye Uriostegui, am serving as a trained medical scribe to document services personally  performed by Shey Garnett MD based on the provider's statements to me on August 7, 2021.  This document has been checked and approved by the attending provider.    I, Shey Garnett MD, was physically present and have reviewed and verified the accuracy of this note documented by Faye Uriostegui, medical scribe.     This note was created in part by the use of Dragon voice recognition dictation system. Inadvertent grammatical errors and typographical errors may still exist.  MD SHEY Reyes MD  8/7/2021   [facility] EMERGENCY DEPARTMENT     Shey Garnett MD  08/07/21 1554

## 2021-08-07 NOTE — ED PROVIDER NOTES
Sign out Provider: Tamir  Sign out Plan: 25-year-old G1, P0 female at 15 weeks gestation presented to the emergency department with recent alcohol use and suicidal ideation.  Patient is currently pending completion of DEC assessment with plan to admit to mental health.  Pending FHT evaluation as well.      Reassessment: Patient was seen and evaluated by DEC  and have recommended inpatient admission.  She is in agreement with this plan.  Fetal heart tones were obtained and are 140-144.  She denies any vaginal bleeding, abdominal pain or other OB concerns.  I have reviewed her labs which show no significant abnormalities.    Disposition: Admit           Liya Dallas MD  08/07/21 6043

## 2021-08-07 NOTE — TELEPHONE ENCOUNTER
Pt presents in fv ed Si and etoh abuse  B: Pt 15 weeks pregnant, states binge drinking 3x week, last use last night.  Pt unable to stop as otpt.  Pt worsening depression, has constant Si with no plan .  pt states often driving fast and looking at bridges. Initially called in for a detox admit but pt will not meet the detox protocol standards. Awaiting DEC assessment for MH admit  Placed on wait list

## 2021-08-07 NOTE — PHARMACY-ADMISSION MEDICATION HISTORY
Admission Medication History Completed by Pharmacy    See [facility] Admission Navigator for allergy information, preferred outpatient pharmacy, prior to admission medications and immunization status.     Medication History Sources:     Dispense Report, Care Everywhere, Patient     Changes made to PTA medication list (reason):    Added: Per patient, confirmed with care everywhere    o Hydroxyzine 10 mg tablet   o Ondansetron 4 mg ODT tablet   o Prenatal tablet     Deleted: Per patient stopped taking, consistent with dispense report    o Fluoxetine 40 mg capsule: once daily   o Gabapentin 300 mg capsule: once nightly prn   o Norgestimate-ethinyl estradiol 0.25-35 mg-mcg tablet: once daily     Changed: None    Additional Information:    Patient reportedly stopped taking all other medications over a week ago when she began day treatment with [facility].     Prior to Admission medications    Medication Sig Last Dose Taking? Auth Provider   hydrOXYzine (ATARAX) 10 MG tablet Take one to two tablets by mouth twice a day as needed for anxiety Past Week Yes Reported, Patient   ondansetron (ZOFRAN-ODT) 4 MG ODT tab Take 4 mg by mouth every 8 hours as needed for nausea 8/7/2021 Yes Reported, Patient   Prenatal Vit-Fe Fumarate-FA (PRENATAL MULTIVITAMIN W/IRON) 27-0.8 MG tablet Take 1 tablet by mouth daily Past Week Yes Reported, Patient       Date completed: 08/07/21    Medication history completed by: Rachel Love - Pharmacy Intern
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
## 2021-08-08 VITALS
HEIGHT: 66 IN | HEART RATE: 74 BPM | DIASTOLIC BLOOD PRESSURE: 83 MMHG | SYSTOLIC BLOOD PRESSURE: 125 MMHG | WEIGHT: 142.9 LBS | TEMPERATURE: 98.3 F | RESPIRATION RATE: 16 BRPM | BODY MASS INDEX: 22.97 KG/M2 | OXYGEN SATURATION: 98 %

## 2021-08-08 LAB
CHOLEST SERPL-MCNC: 201 MG/DL
FASTING STATUS PATIENT QL REPORTED: YES
HDLC SERPL-MCNC: 80 MG/DL
LDLC SERPL CALC-MCNC: 98 MG/DL
NONHDLC SERPL-MCNC: 121 MG/DL
TRIGL SERPL-MCNC: 117 MG/DL
TSH SERPL DL<=0.005 MIU/L-ACNC: 1.75 MU/L (ref 0.4–4)

## 2021-08-08 PROCEDURE — 250N000013 HC RX MED GY IP 250 OP 250 PS 637: Performed by: PSYCHIATRY & NEUROLOGY

## 2021-08-08 PROCEDURE — 36415 COLL VENOUS BLD VENIPUNCTURE: CPT | Performed by: PSYCHIATRY & NEUROLOGY

## 2021-08-08 PROCEDURE — 84443 ASSAY THYROID STIM HORMONE: CPT | Performed by: PSYCHIATRY & NEUROLOGY

## 2021-08-08 PROCEDURE — 80061 LIPID PANEL: CPT | Performed by: PSYCHIATRY & NEUROLOGY

## 2021-08-08 PROCEDURE — 99222 1ST HOSP IP/OBS MODERATE 55: CPT | Mod: AI | Performed by: PSYCHIATRY & NEUROLOGY

## 2021-08-08 RX ADMIN — HYDROXYZINE HYDROCHLORIDE 10 MG: 10 TABLET ORAL at 08:24

## 2021-08-08 RX ADMIN — FLUOXETINE 20 MG: 20 CAPSULE ORAL at 14:03

## 2021-08-08 RX ADMIN — PRENATAL VITAMINS-IRON FUMARATE 27 MG IRON-FOLIC ACID 0.8 MG TABLET 1 TABLET: at 08:24

## 2021-08-08 ASSESSMENT — ACTIVITIES OF DAILY LIVING (ADL)
ORAL_HYGIENE: INDEPENDENT
DRESS: SCRUBS (BEHAVIORAL HEALTH);INDEPENDENT
LAUNDRY: WITH SUPERVISION
HYGIENE/GROOMING: INDEPENDENT

## 2021-08-08 ASSESSMENT — MIFFLIN-ST. JEOR: SCORE: 1399.94

## 2021-08-08 NOTE — PROGRESS NOTES
SPIRITUAL HEALTH SERVICES  SPIRITUAL ASSESSMENT Progress Note  [facility] ([facility]) [facility]   ON-CALL    REFERRAL SOURCE: request at admission for chaplaincy care    In consultation with unit staff, was informed that Yolie was preparing to discharge and no longer requesting a spiritual health visit.    PLAN: SHS remains available, as needed.    Elda Justin  Chaplain Resident  Pager: [phone]

## 2021-08-08 NOTE — PROGRESS NOTES
08/07/21 2234   Patient Belongings   Did you bring any home meds/supplements to the hospital?  Yes   Disposition of meds  Sent to security/pharmacy per site process  (Given to RN / Envelope #[id number])   Patient Belongings locker  (Locker #17)   Patient Belongings Put in Hospital Secure Location (Security or Locker, etc.) cell phone/electronics;clothing;glasses;medical/assistive equipment;tote;wallet;shoes   Belongings Search Yes   Clothing Search Yes   Second Staff Corrie (RN 30NB)       Patient Belongings in Locker #17:   - Clothing (shoes, fabric face mask, T-shirts, sports bras, shorts, underwear, leggings, sweatshirt with strings)   - Toiletries (wipes, makeup, hairbrush, lotion, feminine products, hair binder, mouthguard and case)   - 2 large bags   - Bent   - BP machine and cuff   - Sound machine with cord and plug   - Pillowcase    - [place] and books   - Miscellaneous paperwork   - Cell phone along with charging cord and wall box   - Glasses with case   - Wallet    - Loose change   - [place] ID   - Health insurance cards   - Rewards cards    Medications given to Nurse / Sent to Security / Envelope #[id number]:   - 4 bottles of medications     Patient Valuables Sent to Security / Envelope #[id number]:   - [place] [employer] gift card   - [employer] mastercard (#6575)   - [employer] gold visa (#[id number])   - Check book with checks (#[phone] #[phone])      A               Admission:  I am responsible for any personal items that are not sent to the safe or pharmacy.  [facility] is not responsible for loss, theft or damage of any property in my possession.    Signature:  _________________________________ Date: _______  Time: _____                                              Staff Signature:  ____________________________ Date: ________  Time: _____      2nd Staff person, if patient is unable/unwilling to sign:    Signature: ________________________________ Date: ________  Time: _____      Discharge:  [facility] has returned all of my personal belongings:    Signature: _________________________________ Date: ________  Time: _____                                          Staff Signature:  ____________________________ Date: ________  Time: _____

## 2021-08-08 NOTE — DISCHARGE INSTRUCTIONS
Behavioral Discharge Planning and Instructions    Summary: You were admitted on 8/7/2021  due to Depression and Chemical Use Issues.  You were treated by Dr. Kalie MD and discharged on 8/8/21 from [facility] to Home    Main Diagnosis:   Anxiety and Depression  Alcohol Abuse  15 weeks gestation pregnancy     Health Care Follow-up:   Mother Baby Program, [facility]  You will be attending M-Th from 9:30-2:30  [place] [address]  For appointments call: [phone] ([facility])  Fax #: [phone]    Dr. Alva Fuchs is the psychiatric provider through the Mother Baby Program. She will be in charge of psychiatric care while you are in that program. You will be able to make same day appointments anytime you need to while you are enrolled in the program. You have outpatient providers through [facility] that you will resume care with once you complete the program.     Rule 25/Chemical Health Assessments:  [facility]  [address] [place] [address]  Phone: [phone]    [address] [address]  Phone: [phone]    [facility]  Phone: [phone]  We offer walk-in assessments, and assessments by appointment per the following schedule:  Weekday Assessments - Monday thru Friday, 7:00am to 1:45pm  [address] [place]  [address]  Weekend Assessments - Saturday s, 7:45am to 10:45am  [address], [place]  [address]     [facility]  We offer in-person assessments on a first-come first-served, walk-in basis, Monday - Friday, at our [place] location at [address] [place] [address]. Doors will open at 6:30 a.m  Phone: [phone]     [facility]  [address] [place] [address]   Phone: [phone]        Attend all scheduled appointments with your outpatient providers. Call at least 24 hours in advance if you need to reschedule an appointment to  ensure continued access to your outpatient providers.     Major Treatments, Procedures and Findings:  You were provided with: a psychiatric assessment, assessed for medical stability, medication evaluation and/or management, group therapy and milieu management    Symptoms to Report: feeling more aggressive, increased confusion, losing more sleep, mood getting worse or thoughts of suicide    Early warning signs can include: increased depression or anxiety sleep disturbances increased thoughts or behaviors of suicide or self-harm  increased unusual thinking, such as paranoia or hearing voices    Safety and Wellness:  Take all medicines as directed.  Make no changes unless your doctor suggests them.  Follow treatment recommendations. Refrain from alcohol and non-prescribed drugs.  If there is a concern for safety, call 911.    Resources:   Crisis services are available 24/7 if you are having a mental health crisis.    COPE (Community Outreach for Psychiatric Emergencies): [phone]    In the [facility], call **CRISIS (226251) from a cell phone to talk to a team of professionals who can help you.    Crisis Text Line: Text [place] to 643751    These are crisis residences where you can stay for a short time if you feel like you need to stabilize but do not need to go to the hospital.  [facility] - Crisis Beds  1.[facility] Crisis Stabilization Program  [address] [place] [address]  Phone:[phone]    2.[facility]  [address] [place] [address]  Phone: [phone]    3. [facility]   [address]  [place] [address]  Phone: [phone]    Walk In Counseling Center: If you need immediate counseling you can access the Walk In Counseling Center. Their mission is to provide free, easily accessible mental health counseling to people with urgent needs and few service options.   Walk-In at [address] [place] [address]  Phone: [phone]  M, W, F: 1:00PM-3:00PM  M, T, W, Th:  6:30PM-8:30PM    *Due to COVID you need to access these walk in services by phone:   To attend any clinic by phone, call one of these numbers.  If you get a busy signal, go to the next:    +7    +4    +5   +6   +8   +0   When prompted, enter this Meeting ID: [id number]      [facility] ([facility]) [facility] Phone: [phone]   [address] [place] [address] Fax: [phone] [place] [address]   Hours: 24 hours per day, 7 days per week Services: Emergency evaluation and short-term treatment for persons in a psychiatric crisis. The center works closely with other professionals, agencies, and community resources. Services include: crisis intervention phone service, immediate help and referral for persons threatening suicide; and walk-in crisis services.     If you ever need immediate access to services, [facility] has a walk in triage services to help you with what you need. This Triage Center is located in Room N141 at [address]. Go through the front door of [address], and follow signs to N141.   Triage hours: 10:00 am - 5:00 pm Triage Phone Number: [phone]    [facility] Crisis Line: [phone] (se macy rich)  Hearing Impaired: TDD [phone]  Help is available 24-hours a day, 7 days a week for people experiencing a mental health crisis. Concerned friends, family or community members are also encouraged to call. Please call [facility] Crisis Line at [phone] to speak with a mental health professional. If you speak a language other than English, a phone  will be provided.  If you are facing a life-threatening emergency, please dial 911. If you are a   in crisis, please call [phone] ([phone]) and press 1 for support specific to veterans.  The [facility] Crisis Team  The  [facility] Crisis Team is comprised of master's-level clinicians who can respond 24 hours a day to children and adults in crisis when face-to-face contact is needed. There is no charge for this service. Available services include:    Urgent mental health and safety assessments at our clinic in [place] or in the community as needed    Assistance with referrals for ongoing treatment services and resources    Brief crisis stabilization and safety planning    Prioritized response for police, fire and EMS on mental health calls    Interpreters are provided for non-English speakers  Referrals to the Crisis Team are made by the [facility] Crisis Line. Please call [phone] to discuss your situation.       General Medication Instructions:   See your medication sheet(s) for instructions.   Take all medicines as directed.  Make no changes unless your doctor suggests them.   Go to all your doctor visits.  Be sure to have all your required lab tests. This way, your medicines can be refilled on time.  Do not use any drugs not prescribed by your doctor.  Avoid alcohol.    Advance Directives:   Scanned document on file with [facility]? No scanned doc  Is document scanned? No. Copy Requested.  Honoring Choices Your Rights Handout: Minor - N/A (N/a)  Was more information offered? Minor-N/A (n/a has AD already)    The Treatment team has appreciated the opportunity to work with you. If you have any questions or concerns about your recent admission, you can contact the unit which can receive your call 24 hours a day, 7 days a week. They will be able to get in touch with a Provider if needed. The unit number is [phone].

## 2021-08-08 NOTE — CONSULTS
IM consulted for pregnancy monitoring during inpatient stay. Would recommend OB consult. Discussed with RN as well as Psychiatry attending.     Please re consult if any acute medical questions or concerns arise.     Malina Michael PA-C  Hospitalist Service  [phone]
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
## 2021-08-08 NOTE — PLAN OF CARE
Pt discharged at 1700 to home AMA, pt was picked up by her boyfriend at the [facility] entrance. Pt denies SI/SIB/HI at time of discharge. No medications were changed or ordered for pt and she was discharged with all of her belongings. AVS was reviewed and signed and pt signed for her returned belongings and items that were returned from security. Pt identified her boyfriend and parents as her support network, and DBT skills as her coping skills. Pt was escorted off the unit by staff. Pt filled out and signed AMA form prior to discharge.

## 2021-08-08 NOTE — PLAN OF CARE
Plan of care   Problem: Sleep Disturbance  Goal: Adequate Sleep/Rest  Outcome: Improving  Received pt sleeping in bed with regular unlabored respiration; No sign of withdrawal was noted; was calm and cooperative with MSSA assessment; was assessed twice for MSSA per order  and scored 2 and 3;  No PRN was requested/administered; denied physical illness and pain;  No safety concern to report; pt appeared to have slept for 6.5 hrs this shift; will continue to monitor and assess.

## 2021-08-08 NOTE — PLAN OF CARE
Problem: Suicidal Behavior  Goal: Suicidal Behavior is Absent or Managed  Outcome: Improving     Pt has spent the majority of the shift in her room resting and reading or in the dining room. Pt ate both meals, intake 75% or greater. Pt MSSA this shift was a 3 at 0800, denies any w/d symptoms at this time. Pt is denying SI/SIB/HI/A/VH at this time and reports she wants to see a provider and go home. Pt initially willing to stay on the unit for an additional day and agreeable to plan of care but became tearful and upset after the arrival of a roommate after lunch and signed a 12 hr intent which was witnessed by co-RN  at 1350. Pt states she has a plan in place and will follow up on her own to get CD assessment, will also be attending mother baby program at [facility]. Pt filled out AMA form and will discharge on next shift.

## 2021-08-08 NOTE — PLAN OF CARE
"Pt admitted to station 30 on 8/7/21 2130 for SI and alcohol abuse. Pt is a 25 year old female who has history of anxiety, alcohol abuse, and is 15 weeks pregnant currently. Pt was compliant with initial search, orientation to the unit, and admission profile completion.     Allergies: NKDA    Legal status: Voluntary    Current stressors: Reports having \"falling out\" with friends. She is moving, just finalized a divorce, and is 15 weeks pregnant with her first child with her boyfriend. She is binge drinking to \"cope with my depression and anxiety.\"    Social: Pt currently lives with boyfriend. She is a .    Psych: Patient reports she has OCD and \"health anxiety.\" She states that she gets \"obsessed with diseases\" and \"researches them constantly and then I think I have them.\" She also endorses a past of disordered eating, but reports she has just had difficulty keeping food down recently d/t pregnancy. This is her first inpatient mental health admission. She has previously completed two outpatient mental health treatments at [facility]. She has not been to treatment for her alcohol use, but thinks \"I will probably need to go to something for that after I leave here.\"    She is bright, polite, well groomed, and appears slightly younger than her stated age. She is anxious and reports \"being in total mental distress.\"    -Suicide: She has not attempted suicide, but has passive thoughts and engages in reckless behavior. She binge drinks and then drives, and reports that she knows \"that I could die from that.\" She has thoughts of suicide currently, but can contract for safety in the hospital.    -Self-injurious behavior: Pt reports she used to cut herself when she was \"under the age of 20\" but no longer does this. She also reports that about six months ago, she used her hands to \"hit my head as a form of self harm.\" She reports no self harm thoughts at this time.    CD: She currently binge drinks (at least 7 " glasses of wine/alcohol drinks) 2-4 times per month. She had three alcohol binges the past week. Patient is on MSSA with vitals, but no medications for this concern.    Medications:   -Currently scheduled: Prenatal vitamin daily  -PRNs: Hydroxyzine 10 mg BID PRN, Zofran 4 mg Q 8 hours PRN    Precautions: She is on suicide and withdrawal precautions.
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
## 2021-12-15 ENCOUNTER — LAB REQUISITION (OUTPATIENT)
Dept: LAB | Age: 26
End: 2021-12-15

## 2021-12-15 DIAGNOSIS — Z34.93 ENCOUNTER FOR SUPERVISION OF NORMAL PREGNANCY, UNSPECIFIED, THIRD TRIMESTER: ICD-10-CM

## 2021-12-15 PROCEDURE — 86780 TREPONEMA PALLIDUM: CPT | Performed by: CLINICAL MEDICAL LABORATORY

## 2021-12-15 PROCEDURE — PSEU8563 TREPONEMA PALLIDUM ANTIBODY IGG AND IGM: Performed by: CLINICAL MEDICAL LABORATORY

## 2021-12-16 LAB — T PALLIDUM IGG SER QL IA: NONREACTIVE

## 2022-02-11 VITALS
WEIGHT: 122.6 LBS | SYSTOLIC BLOOD PRESSURE: 114 MMHG | HEART RATE: 60 BPM | DIASTOLIC BLOOD PRESSURE: 80 MMHG | BODY MASS INDEX: 20.09 KG/M2

## 2022-02-11 VITALS
HEIGHT: 66 IN | HEART RATE: 64 BPM | DIASTOLIC BLOOD PRESSURE: 78 MMHG | SYSTOLIC BLOOD PRESSURE: 122 MMHG | BODY MASS INDEX: 20.25 KG/M2 | HEIGHT: 66 IN | HEIGHT: 66 IN | TEMPERATURE: 98.9 F | DIASTOLIC BLOOD PRESSURE: 74 MMHG | HEART RATE: 60 BPM | BODY MASS INDEX: 20.25 KG/M2 | SYSTOLIC BLOOD PRESSURE: 112 MMHG | HEART RATE: 62 BPM | SYSTOLIC BLOOD PRESSURE: 126 MMHG | WEIGHT: 126 LBS | DIASTOLIC BLOOD PRESSURE: 78 MMHG | WEIGHT: 126 LBS | RESPIRATION RATE: 16 BRPM | TEMPERATURE: 97.8 F | BODY MASS INDEX: 19.44 KG/M2 | WEIGHT: 121 LBS

## 2022-02-11 VITALS
HEIGHT: 66 IN | TEMPERATURE: 98 F | BODY MASS INDEX: 19.19 KG/M2 | BODY MASS INDEX: 18.99 KG/M2 | WEIGHT: 118.2 LBS | SYSTOLIC BLOOD PRESSURE: 108 MMHG | DIASTOLIC BLOOD PRESSURE: 78 MMHG | HEART RATE: 64 BPM | DIASTOLIC BLOOD PRESSURE: 82 MMHG | HEIGHT: 66 IN | HEART RATE: 64 BPM | SYSTOLIC BLOOD PRESSURE: 118 MMHG | WEIGHT: 119.4 LBS | TEMPERATURE: 98.1 F

## 2022-02-16 NOTE — PROCEDURES
Accession Number:       [id number]  CLINICAL INFORMATION::     None given  LMP::     09-  PREV. PAP::     8/2015  PREV. BX::     NONE GIVEN  SOURCE::     Cervix  STATEMENT OF ADEQUACY::     Satisfactory for evaluation. Endocervical/transformation zone component present.  INTERPRETATION/RESULT::     Negative for intraepithelial lesion or malignancy.  COMMENT::     See comment       This case could not be evaluated with computer       assisted technology. The slide was manually       screened according to routine procedures.  CYTOTECHNOLOGIST::     EULA MCKENZIE(ASCP) CT Screening location: [place] [address]  REVIEW CYTOTECHNOLOGIST::     EULA LANE(ASCP) CT Screening location: [place] [address]

## 2022-02-16 NOTE — PROGRESS NOTES
Patient:   DOREEN HAYDEN            MRN: [id number]            FIN: [id number]               Age:   21 years     Sex:  Female     :  1995   Associated Diagnoses:   Anal fissure; Hemorrhoid   Author:   Pallavi Head      Visit Information      Date of Service: 2017 03:31 pm  Performing Location: [facility]  Encounter#: [id number]      Chief Complaint   2017 3:43 PM CDT    mantoux check/ blood in stool. Pt states that blood in stool began around 3/14/17 and that blood is bright red. She is not experiencing any pain, and the bleeding is constant, (1 or two times a stool had passed there has not been blood).        History of Present Illness   Patient is a 21 year old female who presents for evaluation of rectal bleeding x 1.5 months with bowel movements. Patient reports noticing bright red blood in toilet and on tissue with each BM. She has not seen blood in actual stool. Complains of some pain that she experiences  immediately after having BMs. She does not feel she is constipated but does note that she does not drink enough water. She is wondering if this could be due to hemorrhoids but she has never had these in the past. Patient has not tried any treatments. Denies abdominal pain, back pain, fever, chills, nausea, vomiting, urinary symptoms, weight loss, or night sweats. Grandfather has hx of colitis. No family hx of colon cancer or abnormal polyps.      Review of Systems   Constitutional:  Negative.    Respiratory:  Negative.    Cardiovascular:  Negative.    Gastrointestinal:  Negative except as documented in history of present illness.    Genitourinary:  Negative.    Musculoskeletal:  Negative.    Integumentary:  Negative.    Neurologic:  Negative.       Health Status   Allergies:    Allergic Reactions (Selected)  No known allergies   Medications:  (Selected)   Prescriptions  Prescribed  Ortho-Cyclen 0.25 mg-35 mcg oral tablet: 1 tab(s), po, daily, # 84 tab(s), 4 Refill(s),  Type: Maintenance, Pharmacy: [id number], 1 tab(s) po daily  procardia gel/ointment: procardia gel/ointment, See Instructions, Instructions: use treatment up to TID for 14d, Supply, # 42 supp, 0 Refill(s), Type: Maintenance, Pharmacy: [facility], please formulate procardia suppository or ointment for anal fissure treatment, use matti...  Documented Medications  Documented  Daily Multiple Vitamins: 1 tab(s), po, daily, 0 Refill(s), Type: Maintenance  Vitamin C: po, daily, 0 Refill(s), Type: Maintenance   Problem list:    All Problems (Selected)  Pap smear abnormality of cervix with ASCUS favoring benign / SNOMED CT 792573362 / Confirmed      Histories   Past Medical History:    Resolved  Menarche (V21.8): Onset in 2005 at 10 years.  Resolved.   Family History:    CA - Breast cancer  Grandmother (M)  Comments:  9/1/2016 4:32 PM - Marbella Martinez CMA  Ductal  Skin cancer  Mother ([place])  Comments:  9/1/2016 4:32 PM - Marbella Martinez CMA  Basal Cell     Procedure history:    No active procedure history items have been selected or recorded.   Social History:        Alcohol Assessment            Current, 0-1 times per week      Tobacco Assessment            Never      Substance Abuse Assessment            Never      Employment and Education Assessment            Student      Home and Environment Assessment            Marital status: Single.  0 children.      Nutrition and Health Assessment            Type of diet: Regular.      Exercise and Physical Activity Assessment            Exercise frequency: 3-5 x per week..  Exercise type: cardio, etc..      Sexual Assessment            Sexually active: Yes.  Sexual orientation: Heterosexual.  Contraceptive Use Details: Birth control pill.               History of STD: No.      Other Assessment            Last eye exam 2013; Last dental exam 2014        Physical Examination   Vital Signs   4/20/2017 3:43 PM CDT Peripheral Pulse Rate 60 bpm    Pulse Site Radial artery    HR  Method Manual    Systolic Blood Pressure 114 mmHg    Diastolic Blood Pressure 80 mmHg    Mean Arterial Pressure 91 mmHg    BP Site Right arm    BP Method Manual      Measurements from flowsheet : Measurements   4/20/2017 3:43 PM CDT    Weight Measured - Standard                122.6 lb     General:  Alert and oriented, No acute distress.    Neck:  Supple.    Respiratory:  Respirations are non-labored.    Gastrointestinal:  Soft, Non-tender, Non-distended, Normal bowel sounds, No organomegaly,   Rectal: Patient with external hemorrhoid noted at 3 o'clock position, there is anal fissure present that is actively bleeding when skin is stretched.    Musculoskeletal:  Normal range of motion, Normal gait.    Integumentary:  Warm, Dry.    Psychiatric:  Cooperative, Appropriate mood & affect.       Impression and Plan   Diagnosis     Anal fissure (RLS28-OU K60.2).     Hemorrhoid (UGS45-WS K64.9).     Presents for evaluation of bright red rectal bleeding with bowel movements x 1.5 months. Vitals normal. Exam shows external hemorrhoid and anal fissure as noted above. Provided prescription of procardia gel for patient to use as prescribed. Advised her to drink plenty of fluids and consume fiber in her diet to ensure she is not constipated. She will use OTC treatments for constipation as needed.   Instructed patient to return to clinic if symptoms not improving or go to ED if symtoms worsen. Patient verbalized understanding and agreement with treatment plan. All questions were answered.  agree with exam and plan

## 2022-02-16 NOTE — PROGRESS NOTES
Patient:   DOREEN HAYDEN            MRN: [id number]            FIN: [id number]               Age:   21 years     Sex:  Female     :  1995   Associated Diagnoses:   Contraceptive management; Dry mouth; Screen for STD (sexually transmitted disease); Well woman exam   Author:   Pallavi Head      Visit Information      Date of Service: 2017 07:53 am  Performing Location: [facility]  Encounter#: [id number]      Primary Care Provider (PCP):  Pallavi Head    NPI# [id number]      Chief Complaint   2017 8:01 AM CDT    Annual physical exam. Patient has concerns about dehydration, especially in the morning. Also would like to discuss IUD's.      Well Adult History   PPC for her annual exam, needs COCs refilled but would like to consider IUD  getting  next year, monogamous relationship for over 2yrs  ASCUS pap , negative HPV  is a runner, waking up in morning with severe dry mouth, drinking well over 3 liters of water daily, no supplements outside of multivitamin      Review of Systems   Constitutional:  Negative.    Eye:  Negative.    Ear/Nose/Mouth/Throat:  Negative except as documented in history of present illness.    Respiratory:  Negative.    Cardiovascular:  Negative.    Breast:  Negative.    Gastrointestinal:  Negative.    Genitourinary:  Negative except as documented in history of present illness.    Gynecologic:  Negative.    Hematology/Lymphatics:  Negative.    Endocrine:  Negative.    Immunologic:  Negative.    Musculoskeletal:  Negative.    Integumentary:  Negative.    Neurologic:  Negative.    Psychiatric:  Negative.             Health Status   Allergies:    Allergic Reactions (Selected)  No known allergies   Medications:  (Selected)   Prescriptions  Prescribed  Ortho-Cyclen 0.25 mg-35 mcg oral tablet: 1 tab(s), po, daily, # 84 tab(s), 4 Refill(s), Type: Maintenance, Pharmacy: [facility] #[id number], 1 tab(s) po daily  procardia gel/ointment: procardia  gel/ointment, See Instructions, Instructions: use treatment up to TID for 14d, Supply, # 42 supp, 0 Refill(s), Type: Maintenance, Pharmacy: [facility], please formulate procardia suppository or ointment for anal fissure treatment, use matti...  Documented Medications  Documented  Daily Multiple Vitamins: 1 tab(s), po, daily, 0 Refill(s), Type: Maintenance  Vitamin C: po, daily, 0 Refill(s), Type: Maintenance   Problem list:    All Problems  Pap smear abnormality of cervix with ASCUS favoring benign / SNOMED CT [id number] / Confirmed      Histories   Family History:    CA - Breast cancer  Grandmother (M)  Comments:  9/1/2016 4:32 PM - Marbella Martinez CMA  Ductal  Skin cancer  Mother ([place])  Comments:  9/1/2016 4:32 PM - Marbella Martinez CMA  Basal Cell     Procedure history:    No active procedure history items have been selected or recorded.   Social History:        Alcohol Assessment            Current, 0-1 times per week      Tobacco Assessment            Never      Substance Abuse Assessment            Never      Employment and Education Assessment            Student      Home and Environment Assessment            Marital status: Single.  0 children.      Nutrition and Health Assessment            Type of diet: Regular.      Exercise and Physical Activity Assessment            Exercise frequency: 3-5 x per week..  Exercise type: cardio, etc..      Sexual Assessment            Sexually active: Yes.  Sexual orientation: Heterosexual.  Contraceptive Use Details: Birth control pill.               History of STD: No.      Other Assessment            Last eye exam 2013; Last dental exam 2014        Physical Examination   Vital Signs   9/13/2017 8:01 AM CDT Temperature Tympanic 98.1 DegF    Peripheral Pulse Rate 64 bpm    Systolic Blood Pressure 118 mmHg    Diastolic Blood Pressure 82 mmHg    Mean Arterial Pressure 94 mmHg    BP Site Right arm    BP Method Manual      Measurements from flowsheet : Measurements   9/13/2017  8:01 AM CDT Height Measured - Standard 66.25 in    Weight Measured - Standard 118.2 lb    BSA 1.58 m2    Body Mass Index 18.93 kg/m2      General:  Alert and oriented, No acute distress.    Eye:  Pupils are equal, round and reactive to light, Intact accommodation, Normal conjunctiva, Vision unchanged.         Periorbital area: Within normal limits.    HENT:  Normocephalic, Tympanic membranes are clear, Normal hearing, Oral mucosa is moist, No pharyngeal erythema, No sinus tenderness.    Neck:  Supple, Non-tender, No lymphadenopathy, No thyromegaly.    Respiratory:  Lungs are clear to auscultation, Respirations are non-labored, No chest wall tenderness.    Cardiovascular:  Normal rate, Regular rhythm, No murmur, No edema.    Breast:  No mass, No tenderness.    Gastrointestinal:  Soft, Non-tender, Non-distended, Normal bowel sounds, No organomegaly.    Genitourinary:  No costovertebral angle tenderness, menses currently.         Labia: Bilateral, Within normal limits.         Mons pubis: WNL.         Perineum: Within normal limits.         Vulva: Within normal limits.         Urethra: Within normal limits.         Cervix: Within normal limits.         Uterus: Within normal limits.         Adnexa: Bilateral, Within normal limits.    Lymphatics:  No lymphadenopathy neck, axilla, groin.    Musculoskeletal:  Normal range of motion, Normal strength, No swelling.    Integumentary:  Warm, Dry, Pink.    Neurologic:  Alert, Oriented.    Psychiatric:  Cooperative, Appropriate mood & affect.       Review / Management   Results review:  hgb, iron panel, TSH, BMP and STD screening today.       Impression and Plan   Diagnosis     Contraceptive management (LMB72-AW Z30.9).     Dry mouth (JDQ81-LQ R68.2).     Screen for STD (sexually transmitted disease) (WOU74-PH Z11.3).     Well woman exam (IWD53-UA Z01.419).     Patient Instructions:       Counseled: Patient, Regarding diagnosis, Regarding medications, Verbalized understanding.     Summary:  pap obtained, if ascus likely will need repap after menses  discussed IUD types, concerns, risks, benefits, will consider and schedule with [facility] if desired, if she waits extended period may need STD redone  discussed dry mouth, screening for some concerns, if all negative and if dry mucosa occurs in vagina or dry eyes may need to consider autoimmune disorders like sjogrens  .    Orders     Orders (Selected)   Prescriptions  Prescribed  Ortho-Cyclen 0.25 mg-35 mcg oral tablet: 1 tab(s), po, daily, # 90 tab(s), 3 Refill(s), Type: Maintenance, Pharmacy: [id number], 1 tab(s) po daily.

## 2022-02-16 NOTE — PROGRESS NOTES
Patient:   DOREEN HAYDEN            MRN: [id number]            FIN: [id number]               Age:   22 years     Sex:  Female     :  1995   Associated Diagnoses:   Encounter for removal of intrauterine contraceptive device   Author:   Haroon Frazier MD      Chief Complaint   2018 8:51 AM CST    IUD removal per [facility]- attempted yesterday, but was unsuccessful.      Interval History   The patient is seen for CRISTEL Ya, for IUD removal.  IUD was attempted yesterday but it was too painful.  Therefore, the patient returns today for a second try.  History is well-outlined in the previous note.  The patient is absolutely certain she wants the IUD out and will be started on OCP for birth control.      Review of Systems   Review  of systems is negative except as documented under interval history.      Health Status   Allergies:    Allergic Reactions (Selected)  No known allergies   Medications:  (Selected)   Prescriptions  Prescribed  Ativan 0.5 mg oral tablet: See Instructions, Instructions: 1 tab(s) po one hour before appt, # 1 tab(s), 0 Refill(s), Type: Maintenance, Pharmacy: [id number], 1 tab(s) po one hour before appt  Ortho-Cyclen 0.25 mg-35 mcg oral tablet: 1 tab(s), po, daily, # 90 tab(s), 3 Refill(s), Type: Maintenance, Pharmacy: [id number], 1 tab(s) po daily  ParaGard intrauterine device: See Instructions, Instructions: Placed 2017. Due to be removed by 2027., # 1 EA, 0 Refill(s), Type: Maintenance, other reason (Rx)  Percocet 5/325 oral tablet: 1 tab(s), po, q4-6 hrs, Instructions: use tonight if more pain, # 2 tab(s), 0 Refill(s), Type: Maintenance, Pharmacy: [id number], 1 tab(s) po q4-6 hrs,Instr:use tonight if more pain  Documented Medications  Documented  Daily Multiple Vitamins: 1 tab(s), po, daily, 0 Refill(s), Type: Maintenance  Vitamin C: po, daily, 0 Refill(s), Type: Maintenance   Problem list:    All Problems  Pap smear abnormality of cervix with  ASCUS favoring benign / SNOMED CT 430786743 / Confirmed  Seasonal allergies / SNOMED CT 035106267 / Confirmed  Resolved: Menarche / ICD-9-CM V21.8      Histories   Past Medical History:    Active  Seasonal allergies (439326735)  Resolved  Menarche (V21.8): Onset in 2005 at 10 years.  Resolved.   Family History:    CA - Breast cancer  Grandmother (M)  Comments:  9/1/2016 4:32 PM - Marbella Martinez CMA  Ductal  Skin cancer  Mother ([place])  Comments:  9/1/2016 4:32 PM - Marbella Martinez CMA  Basal Cell     Procedure history:    Insertion of IUD ([id number]) on 11/29/2017 at 22 Years.  Comments:  11/29/2017 11:00 AM - Quita Magallanes  Concent form signed with [facility]    Paragard due for removal by 11/29/2027.    Lot #[id number] exp date: 01/2024      Physical Examination   Vital Signs   1/19/2018 8:51 AM CST Peripheral Pulse Rate 62 bpm    HR Method Electronic    Systolic Blood Pressure 126 mmHg    Diastolic Blood Pressure 74 mmHg    Mean Arterial Pressure 91 mmHg    BP Site Right arm    BP Method Electronic   1/18/2018 4:48 PM CST Temperature Tympanic 98.9 DegF    Peripheral Pulse Rate 64 bpm    Pulse Site Radial artery    HR Method Manual    Systolic Blood Pressure 112 mmHg    Diastolic Blood Pressure 78 mmHg    Mean Arterial Pressure 89 mmHg    BP Site Right arm    BP Method Manual      Gynecologic:  The cervix does have a fairly small os.  After Betadine prep of the cervix cervical block was placed.  After waiting a few minutes the IUD was removed without difficulty..       Impression and Plan   Diagnosis     Encounter for removal of intrauterine contraceptive device (JGU38-NR Z30.432).     Pain with  IUD, now removed..     Plan:  The patient will return p.r.n..    Patient Instructions:       Counseled: Patient, Regarding diagnosis, Regarding treatment, Regarding medications, Verbalized understanding.

## 2022-02-16 NOTE — PROGRESS NOTES
Patient:   DOREEN HAYDEN            MRN: [id number]            FIN: [id number]               Age:   22 years     Sex:  Female     :  1995   Associated Diagnoses:   IUD check up   Author:   Kimberli Saha      Chief Complaint   2017 6:27 PM CST   Pt here for IUD check that was placed 17.  Pt states she is still having some discharge since IUD was placed.  Pt also c/o sore throat that started today.        History of Present Illness   Symptoms and concerns as expressed in CC reviewed and confirmed with patient  Here for IUD check, recently inserted about 4 weeks ago, paragard  she is very happy with it  No bleeding or pain with IC, minimal spotting, minimal cramping  No new partner since insertion, has had IC twice  She checks strings and feels consistent  Very pleased with her IUD      Health Status   Allergies:    Allergic Reactions (Selected)  No known allergies   Medications:  (Selected)   Prescriptions  Prescribed  Ortho-Cyclen 0.25 mg-35 mcg oral tablet: 1 tab(s), po, daily, # 90 tab(s), 3 Refill(s), Type: Maintenance, Pharmacy: [id number], 1 tab(s) po daily  ParaGard intrauterine device: See Instructions, Instructions: Placed 2017. Due to be removed by 2027., # 1 EA, 0 Refill(s), Type: Maintenance, other reason (Rx)  Documented Medications  Documented  Daily Multiple Vitamins: 1 tab(s), po, daily, 0 Refill(s), Type: Maintenance  Vitamin C: po, daily, 0 Refill(s), Type: Maintenance   Problem list:    All Problems  Pap smear abnormality of cervix with ASCUS favoring benign / SNOMED CT 576481188 / Confirmed  ASCUS with no high risk HPV, repeat in 1 yr  Seasonal allergies / SNOMED CT 574437316 / Confirmed  Resolved: Menarche / ICD-9-CM V21.8      Histories   Past Medical History:    Active  Seasonal allergies (011801679)  Resolved  Menarche (V21.8): Onset in  at 10 years.  Resolved.   Family History:    CA - Breast cancer  Grandmother (M)  Comments:  2016 4:32  PM - Marbella Martinez CMA  Ductal  Skin cancer  Mother ([place])  Comments:  9/1/2016 4:32 PM - Marbella Martinez CMA  Basal Cell     Procedure history:    Insertion of IUD (SNOMED CT 734176618) performed by Kimberli Saha on 11/29/2017 at 22 Years.  Comments:  11/29/2017 11:00 AM - Steven Quita GARCIA  Concent form signed with SATHISH    Paragard due for removal by 11/29/2027.    Lot #[id number] exp date: 01/2024   Social History:        Alcohol Assessment            Past, 0-1 times per week      Tobacco Assessment            Never      Substance Abuse Assessment            Never      Employment and Education Assessment            Employed, Work/School description: .      Home and Environment Assessment            Spouse/Partner name: Joseph Ware.  Kavin children.  Risks in environment: owns secured gun.      Nutrition and Health Assessment            Type of diet: Regular.      Exercise and Physical Activity Assessment            Exercise frequency: 5-7 times per week.  Exercise type: Running.      Sexual Assessment            Sexually active: Yes.  Sexual orientation: Heterosexual.  Contraceptive Use Details: Birth control pill.               History of STD: No.        Physical Examination   Vital Signs   12/27/2017 6:27 PM CST Temperature Tympanic 97.8 DegF  LOW    Peripheral Pulse Rate 60 bpm    Pulse Site Radial artery    Respiratory Rate 16 br/min    Systolic Blood Pressure 122 mmHg    Diastolic Blood Pressure 78 mmHg    Mean Arterial Pressure 93 mmHg    BP Site Right arm      Measurements from flowsheet : Measurements   12/27/2017 6:27 PM CST Height Measured - Standard 66.25 in    Weight Measured - Standard 121 lb    BSA 1.6 m2    Body Mass Index 19.38 kg/m2      General:  Alert and oriented, No acute distress, pelvic exam reveals normal external genitalia  Vault with normal rugae and no discharge  Cervix clear with IUD strings visible at os  NO CMT, NO adnexal tenderness or masses.       Review / Management    Radiology results   we reviewed the IUD placement in the US done 1 month ago      Impression and Plan   Diagnosis     IUD check up (RJC91-PG Z30.431).     Patient Instructions:       Counseled: Patient, Regarding diagnosis, Regarding treatment, Regarding medications, Verbalized understanding, Answered questions  counseled that placement may not be ideal in regards to effectiveness but certainly the IUD is in the uterus and string length does not appear to have increased and she is comfortable with the IUD. She is choosing to continue this method, she will check strings periodically and let me know if there are any problems   .

## 2022-02-16 NOTE — PROGRESS NOTES
Patient:   DOREEN HAYDEN            MRN: [id number]            FIN: [id number]               Age:   22 years     Sex:  Female     :  1995   Associated Diagnoses:   Malpositioned IUD   Author:   Kimberli Saha      Chief Complaint   2018 4:48 PM CST    c/o not being able to feel her Paragard strings, but is concidering having her Paragard removed because she states it has been causing her stress.        History of Present Illness   Here for IUD removal  paragard inserted end of 2017, insertion was difficult for her and f/u US showed IUDin the mid and lower endometrial canal.  She did well at a follow up exam and opted to continue the IUD, same partner, no pain with IC .  Did have LMP  and it was not too crampy  Two days ago she had some more significant cramping  She has been checking strings periodically successfully but over last couple days she can not feel them  She is frustrated that the IUD is causing her so much worry and now more cramping, she would like it removed tonight  She lives in [place] and drove alone tonight for this procedure      Health Status   Medications:  (Selected)   Prescriptions  Prescribed  Ativan 0.5 mg oral tablet: See Instructions, Instructions: 1 tab(s) po one hour before appt, # 1 tab(s), 0 Refill(s), Type: Maintenance, Pharm[facility]: [id number], 1 tab(s) po one hour before appt  Ortho-Cyclen 0.25 mg-35 mcg oral tablet: 1 tab(s), po, daily, # 90 tab(s), 3 Refill(s), Type: Maintenance, [id number], 1 tab(s) po daily  ParaGard intrauterine device: See Instructions, Instructions: Placed 2017. Due to be removed by 2027., # 1 EA, 0 Refill(s), Type: Maintenance, other reason (Rx)  Percocet 5/325 oral tablet: 1 tab(s), po, q4-6 hrs, Instructions: use tonight if more pain, # 2 tab(s), 0 Refill(s), Type: Maintenance, Pharm[id number], 1 tab(s) po q4-6 hrs,Instr:use tonight if more pain  Documented  Medications  Documented  Daily Multiple Vitamins: 1 tab(s), po, daily, 0 Refill(s), Type: Maintenance  Vitamin C: po, daily, 0 Refill(s), Type: Maintenance   Problem list:    All Problems  Pap smear abnormality of cervix with ASCUS favoring benign / SNOMED CT 744115209 / Confirmed  ASCUS with no high risk HPV, repeat in 1 yr  Seasonal allergies / SNOMED CT 240300178 / Confirmed  Resolved: Menarche / ICD-9-CM V21.8      Histories   Past Medical History:    Active  Seasonal allergies (476377470)  Resolved  Menarche (V21.8): Onset in 2005 at 10 years.  Resolved.   Family History:    [place] - Breast cancer  Grandmother (M)  Comments:  9/1/2016 4:32 PM - Marbella Martinez CMA  Ductal  Skin cancer  Mother ([place])  Comments:  9/1/2016 4:32 PM - Marbella Martinez CMA  Basal Cell     Procedure history:    Insertion of IUD (SNOMED CT 318304340) performed by Kimberli Saha on 11/29/2017 at 22 Years.  Comments:  11/29/2017 11:00 AM - Quita Magallanes  Concent form signed with [facility]    Paragard due for removal by 11/29/2027.    Lot #[id number] exp date: 01/2024   Social History:        Alcohol Assessment            Past, 0-1 times per week      Tobacco Assessment            Never      Substance Abuse Assessment            Never      Employment and Education Assessment            Employed, Work/School description: .      Home and Environment Assessment            Spouse/Partner name: Joseph Ware.  Kavin children.  Risks in environment: owns secured gun.      Nutrition and Health Assessment            Type of diet: Regular.      Exercise and Physical Activity Assessment            Exercise frequency: 5-7 times per week.  Exercise type: Running.      Sexual Assessment            Sexually active: Yes.  Sexual orientation: Heterosexual.  Contraceptive Use Details: Birth control pill.               History of STD: No.        Physical Examination   Vital Signs   1/18/2018 4:48 PM CST Temperature Tympanic 98.9 DegF    Peripheral Pulse Rate  64 bpm    Pulse Site Radial artery    HR Method Manual    Systolic Blood Pressure 112 mmHg    Diastolic Blood Pressure 78 mmHg    Mean Arterial Pressure 89 mmHg    BP Site Right arm    BP Method Manual      Measurements from flowsheet : Measurements   1/18/2018 4:48 PM CST Height Measured - Standard 66.25 in    Weight Measured - Standard 126.0 lb    BSA 1.63 m2    Body Mass Index 20.18 kg/m2      General:  Alert and oriented.    Gynecology:  vaginal speculum exam revewal IUD strings protruding from cervical os, they are approx 3 cm long , os is otherwise clear  Strings grasped with sterile ring forceps and steady tension applied, strings came an additional 1 cm but there was resistance and she was uncomfortable at that point so I did not further attempt to remove the device.    She wanted to stop the procedure and it was stopped.  We discussed options this evening and opted to receive Toradol 60mg IM and wait 15 min to see that might make the procedure more tolerable  After 15 min, a speculum exam was again performed with no traction applied to strings yet  but she decided she did not want to procede with removal tonight and procedure was stopped .       Impression and Plan   Diagnosis     Malpositioned IUD ([id number] T83.32XA).     Patient Instructions:       Counseled: Patient, Verbalized understanding, Yolie opted to rtc tomorrow with a  and see Dr Frazier to have IUD removed.  I gave her two percocet to use tonight if pain becomes more significant but at this time the cramping was mild and very tolerable.  I also gave her a small dose of Ativan to take tomorrow (she must have a ) one hour before her appt with Dr Frazier to help her relax some during the evaluation and IUD removal. She is NOT to use the percocet and ativan simultaneously tonight and she understands that.  .

## 2022-02-16 NOTE — PROGRESS NOTES
Patient:   DOREEN HAYDEN            MRN: [id number]            FIN: [id number]               Age:   22 years     Sex:  Female     :  1995   Associated Diagnoses:   Encounter for insertion of ParaGard IUD; Encounter for IUD insertion; Family planning advice   Author:   Zane SANTIAGO, Kimberli      Health Status   Allergies:    Allergic Reactions (Selected)  No known allergies   Medications:  (Selected)   Prescriptions  Prescribed  Ortho-Cyclen 0.25 mg-35 mcg oral tablet: 1 tab(s), po, daily, # 90 tab(s), 3 Refill(s), Type: Maintenance, Pharmacy: [id number], 1 tab(s) po daily  Documented Medications  Documented  Daily Multiple Vitamins: 1 tab(s), po, daily, 0 Refill(s), Type: Maintenance  Vitamin C: po, daily, 0 Refill(s), Type: Maintenance   Problem list:    All Problems  Pap smear abnormality of cervix with ASCUS favoring benign / SNOMED CT 088991361 / Confirmed  ASCUS with no high risk HPV, repeat in 1 yr  Seasonal allergies / SNOMED CT 852206109 / Confirmed  Resolved: Menarche / ICD-9-CM V21.8      Subjective   She is here to receive information on using an Intrauterine device..  She saw Ranjit Guthrie NP a couple months ago and thought about a Mirena but she has since decided she wants NO HORMONES and desires paragard IUD.   She denies the following conditions:   or miscarriage in the past 2 months, abnormalities of the uterus/pelvic organs, , breast cancer history, current breast feeing, unexplained bleeding between periods, diabetes, hepatitis of the liver, history of blood clots/clotting disorder/stroke or MI, history of other types of cancer, known or suspected pregnancy, serve re headaches/migraine with aura, allergy to progestin or copper or Tex's disease, fainting attacks, genital sores, history of ectopic pregnancy, ,current infection of the uterus or cervix.  She usually has monthly periods with oc's, is on week 3 of oc's. Wants to have a monthly period. When she is not on hormones  she has heavy crampy periods.    Current form of birth control:ocs  Last Pap recent normal  Last STI screen recent negative    Counseled 15 min on the use/risk/benefit hormonal IUDs vs COpper.  I am afraid her periods will be heavier with Paragard and that insertion can be more difficult in nulliparous females. She feels strongly that she wants monthly periods and does NOT want a hormonal IUD and wants to try Paragard.       Objective   Vital Signs   11/29/2017 8:19 AM CST Temperature Tympanic 98.0 DegF    Peripheral Pulse Rate 64 bpm    Pulse Site Radial artery    HR Method Manual    Systolic Blood Pressure 108 mmHg    Diastolic Blood Pressure 78 mmHg    Mean Arterial Pressure 88 mmHg    BP Site Right arm    BP Method Manual      Measurements from flowsheet : Measurements   11/29/2017 8:19 AM CST Height Measured - Standard 66.25 in    Weight Measured - Standard 119.4 lb    BSA 1.59 m2    Body Mass Index 19.12 kg/m2         Procedure   Pap smear procedure   Date/ Time:  11/29/2017 3:08:00 PM.     Confirmed: patient, procedure, safety procedures followed.     Performed by: Kimberli Saha.     Informed consent: signed by patient.     Indication: Would like To proceed with IUD insertion. of only Paragard  Pregnancy is ruled out: neg UPT    With the patient in the lithotomy position, her external genitalia is normal. A bimanual exam is performed to locate the position and size of the uterus and there are no pelvic contraindications.   A sterile speculum is used to enter the vaginal canal.  The os is visualized. Three sterile Betadine soaked gauze are used to clean the os thoroughly.  The tenaculum is slowly applied near the lip of the cervix  and gentle traction is applied to align the cervical canal with the uterine cavity.  A sterile uterine sound is inserted to check the patency of the cervix, measure the depth of the uterine cavity, confirm its direction and exclude the presence of any uterine anomaly.  The  uterus sounded to _____7.0 cm__________.     The Paragard  IUD package was open,and the device is loaded into the insertion tube.   The tenaculum is grasped with one hand and with the other hand the insertion tube is gently advance through the cervical canal according to insertion guidelines.   I was not able to fully advance the tube to the 7.0 cm marker and approx 1 cm remained outside the cervical opening .   Pt was uncomfortable and requested procedure be stopped, in removed the IUD insertion tube, the IUD device was retained and strings remained visible approx 3 cm.  Pt no longer felt uncomfortable.  It was opted to leave the IUD in place and check placement with Ultrasound.     Procedure tolerated: well.     Complications: as described above.        Plan   Impression and Plan:  Orders   .    Diagnosis     Encounter for insertion of ParaGard IUD (OYF09-HO Z30.430).     .    Condition Pt proceded to Ultrasound and pelvic US completed to check IUD placement. Per report IUD appears in uterine cavitiy, 2.2 cm from cervical canal to lower tip of IUD and 1.6 cm from top of the uterine endo metrium to the top of the IUD device, device is approximately midline in the uterus.    pt reports cramping while in US. I counseled her again that this is likely normal cramping with IUD insertion and reassued placement appears good. She will continue her current oc pack till it is complete and monitor how she is feeling the next 48 hours. She is welcome to return for removal if desires but given reassurance of pelvic US can use warm pack and ibuprofen.  If still uncomfortable after 48 hours RTCfor recheck.  If doing well should RTC in 4 weeks for string check.  SHe is in agreement with plan.       Impression and Plan   Diagnosis     Encounter for IUD insertion (TJR81-BU Z30.430).     Family planning advice (RAC07-GF Z30.09).

## 2022-03-19 ENCOUNTER — HOSPITAL ENCOUNTER (EMERGENCY)
Facility: CLINIC | Age: 27
Discharge: HOME | End: 2022-03-19
Attending: EMERGENCY MEDICINE | Admitting: EMERGENCY MEDICINE
Payer: COMMERCIAL

## 2022-03-19 VITALS
HEIGHT: 66 IN | HEART RATE: 58 BPM | WEIGHT: 150 LBS | RESPIRATION RATE: 16 BRPM | SYSTOLIC BLOOD PRESSURE: 120 MMHG | DIASTOLIC BLOOD PRESSURE: 85 MMHG | OXYGEN SATURATION: 98 % | TEMPERATURE: 97 F | BODY MASS INDEX: 24.11 KG/M2

## 2022-03-19 DIAGNOSIS — N39.0 UTI (URINARY TRACT INFECTION), BACTERIAL: ICD-10-CM

## 2022-03-19 DIAGNOSIS — A49.9 UTI (URINARY TRACT INFECTION), BACTERIAL: ICD-10-CM

## 2022-03-19 LAB
ALBUMIN UR-MCNC: NEGATIVE MG/DL
APPEARANCE UR: CLEAR
BACTERIA #/AREA URNS HPF: ABNORMAL /HPF
BILIRUB UR QL STRIP: NEGATIVE
COLOR UR AUTO: ABNORMAL
GLUCOSE UR STRIP-MCNC: NEGATIVE MG/DL
HGB UR QL STRIP: ABNORMAL
KETONES UR STRIP-MCNC: NEGATIVE MG/DL
LEUKOCYTE ESTERASE UR QL STRIP: ABNORMAL
NITRATE UR QL: NEGATIVE
PH UR STRIP: 7 [PH] (ref 5–7)
RBC URINE: 1 /HPF
SP GR UR STRIP: 1 (ref 1–1.03)
SQUAMOUS EPITHELIAL: <1 /HPF
UROBILINOGEN UR STRIP-MCNC: NORMAL MG/DL
WBC URINE: 57 /HPF

## 2022-03-19 PROCEDURE — 81001 URINALYSIS AUTO W/SCOPE: CPT | Performed by: FAMILY MEDICINE

## 2022-03-19 PROCEDURE — 250N000013 HC RX MED GY IP 250 OP 250 PS 637: Performed by: EMERGENCY MEDICINE

## 2022-03-19 PROCEDURE — 99284 EMERGENCY DEPT VISIT MOD MDM: CPT | Performed by: EMERGENCY MEDICINE

## 2022-03-19 PROCEDURE — 87086 URINE CULTURE/COLONY COUNT: CPT | Performed by: EMERGENCY MEDICINE

## 2022-03-19 PROCEDURE — 81001 URINALYSIS AUTO W/SCOPE: CPT | Performed by: EMERGENCY MEDICINE

## 2022-03-19 RX ORDER — PHENAZOPYRIDINE HYDROCHLORIDE 100 MG/1
200 TABLET, FILM COATED ORAL ONCE
Status: COMPLETED | OUTPATIENT
Start: 2022-03-19 | End: 2022-03-19

## 2022-03-19 RX ORDER — ACETAMINOPHEN 500 MG
1000 TABLET ORAL EVERY 8 HOURS PRN
Qty: 30 TABLET | Refills: 0 | COMMUNITY
Start: 2022-03-19 | End: 2022-03-24

## 2022-03-19 RX ORDER — NITROFURANTOIN 25; 75 MG/1; MG/1
100 CAPSULE ORAL ONCE
Status: COMPLETED | OUTPATIENT
Start: 2022-03-19 | End: 2022-03-19

## 2022-03-19 RX ORDER — IBUPROFEN 400 MG/1
400 TABLET, FILM COATED ORAL ONCE
Status: COMPLETED | OUTPATIENT
Start: 2022-03-19 | End: 2022-03-19

## 2022-03-19 RX ORDER — IBUPROFEN 200 MG
400-600 TABLET ORAL EVERY 8 HOURS PRN
Qty: 30 TABLET | Refills: 0 | COMMUNITY
Start: 2022-03-19 | End: 2022-03-24

## 2022-03-19 RX ORDER — NITROFURANTOIN 25; 75 MG/1; MG/1
100 CAPSULE ORAL 2 TIMES DAILY
Qty: 10 CAPSULE | Refills: 0 | Status: SHIPPED | OUTPATIENT
Start: 2022-03-19 | End: 2022-03-24

## 2022-03-19 RX ORDER — PHENAZOPYRIDINE HYDROCHLORIDE 200 MG/1
200 TABLET, FILM COATED ORAL 3 TIMES DAILY
Qty: 8 TABLET | Refills: 0 | Status: SHIPPED | OUTPATIENT
Start: 2022-03-19 | End: 2022-03-22

## 2022-03-19 RX ADMIN — IBUPROFEN 400 MG: 400 TABLET ORAL at 23:30

## 2022-03-19 RX ADMIN — PHENAZOPYRIDINE 200 MG: 100 TABLET ORAL at 23:30

## 2022-03-19 RX ADMIN — NITROFURANTOIN (MONOHYDRATE/MACROCRYSTALS) 100 MG: 75; 25 CAPSULE ORAL at 23:30

## 2022-03-19 ASSESSMENT — ENCOUNTER SYMPTOMS
COUGH: 0
WOUND: 0
SHORTNESS OF BREATH: 0
HEMATURIA: 0
FATIGUE: 0
APPETITE CHANGE: 0
CHILLS: 0
LIGHT-HEADEDNESS: 0
BACK PAIN: 0
FREQUENCY: 1
CHEST TIGHTNESS: 0
VOMITING: 0
FEVER: 0
DYSURIA: 1
HEADACHES: 0
ABDOMINAL PAIN: 1
NAUSEA: 0
FLANK PAIN: 0

## 2022-03-20 NOTE — ED PROVIDER NOTES
History     Chief Complaint   Patient presents with     UTI     HPI   Yolie Ware is a 26 year old female with no significant previous past medical history presented for evaluation of urinary frequency, urgency, and dysuria today.  Also with some suprapubic abdominal pain.  No flank pain.  No fevers or chills.  Patient has had urine infections in the past and reports this feels similar.  Denies any vaginal discharge or bleeding.  No medications taken before arrival.      Allergies:  No Known Allergies    Problem List:    Patient Active Problem List    Diagnosis Date Noted     Alcohol abuse 08/07/2021     Priority: Medium     Anxiety and depression 08/07/2021     Priority: Medium     15 weeks gestation of pregnancy 08/07/2021     Priority: Medium        Past Medical History:    No past medical history on file.    Past Surgical History:    No past surgical history on file.    Family History:    No family history on file.    Social History:  Marital Status:   [2]  Social History     Tobacco Use     Smoking status: Never Smoker     Smokeless tobacco: Never Used   Substance Use Topics     Alcohol use: Yes     Comment: rarely     Drug use: Not Currently        Medications:    acetaminophen (TYLENOL) 500 MG tablet  ibuprofen (ADVIL/MOTRIN) 200 MG tablet  nitroFURantoin macrocrystal-monohydrate (MACROBID) 100 MG capsule  phenazopyridine (PYRIDIUM) 200 MG tablet  hydrOXYzine (ATARAX) 10 MG tablet  ondansetron (ZOFRAN-ODT) 4 MG ODT tab  Prenatal Vit-Fe Fumarate-FA (PRENATAL MULTIVITAMIN W/IRON) 27-0.8 MG tablet          Review of Systems   Constitutional: Negative for appetite change, chills, fatigue and fever.   HENT: Negative for congestion.    Respiratory: Negative for cough, chest tightness and shortness of breath.    Gastrointestinal: Positive for abdominal pain (Suprapubic). Negative for nausea and vomiting.   Genitourinary: Positive for dysuria, frequency, pelvic pain and urgency. Negative for decreased  "urine volume, flank pain, hematuria and vaginal bleeding.   Musculoskeletal: Negative for back pain.   Skin: Negative for rash and wound.   Neurological: Negative for light-headedness and headaches.   All other systems reviewed and are negative.      Physical Exam   BP: 120/85  Pulse: 58  Temp: 97  F (36.1  C)  Resp: 16  Height: 167.6 cm (5' 6\")  Weight: 68 kg (150 lb)  SpO2: 98 %      Physical Exam  Vitals and nursing note reviewed.   Constitutional:       Appearance: Normal appearance. She is not ill-appearing or diaphoretic.   HENT:      Head: Atraumatic.      Nose: Nose normal.   Eyes:      Conjunctiva/sclera: Conjunctivae normal.   Cardiovascular:      Rate and Rhythm: Normal rate.      Pulses: Normal pulses.   Pulmonary:      Effort: Pulmonary effort is normal.   Abdominal:      Palpations: Abdomen is soft.      Tenderness: There is abdominal tenderness (Mild suprapubic tenderness). There is no right CVA tenderness or left CVA tenderness.   Skin:     General: Skin is warm and dry.      Capillary Refill: Capillary refill takes less than 2 seconds.   Neurological:      Mental Status: She is alert and oriented to person, place, and time.   Psychiatric:         Mood and Affect: Mood normal.         ED Course                 Procedures              Results for orders placed or performed during the hospital encounter of 03/19/22 (from the past 24 hour(s))   UA with Microscopic reflex to Culture    Specimen: Urine, Midstream   Result Value Ref Range    Color Urine Straw Colorless, Straw, Light Yellow, Yellow    Appearance Urine Clear Clear    Glucose Urine Negative Negative mg/dL    Bilirubin Urine Negative Negative    Ketones Urine Negative Negative mg/dL    Specific Gravity Urine 1.002 (L) 1.003 - 1.035    Blood Urine Large (A) Negative    pH Urine 7.0 5.0 - 7.0    Protein Albumin Urine Negative Negative mg/dL    Urobilinogen Urine Normal Normal, 2.0 mg/dL    Nitrite Urine Negative Negative    Leukocyte Esterase " Urine Large (A) Negative    Bacteria Urine Few (A) None Seen /HPF    RBC Urine 1 <=2 /HPF    WBC Urine 57 (H) <=5 /HPF    Squamous Epithelials Urine <1 <=1 /HPF    Narrative    Urine Culture ordered based on laboratory criteria       Medications   phenazopyridine (PYRIDIUM) tablet 200 mg (has no administration in time range)   ibuprofen (ADVIL/MOTRIN) tablet 400 mg (has no administration in time range)   nitroFURantoin macrocrystal-monohydrate (MACROBID) capsule 100 mg (has no administration in time range)       Assessments & Plan (with Medical Decision Making)  18-year-old female presented for evaluation of urinary tract infection.  Her symptoms suggestive of UTI and a UA suggestive of UTI.  No fevers or CVA tenderness suggest pyelonephritis.  Has not had resistant urine infections in the past.  Does not wear contact lenses.  Was started on Pyridium along with ibuprofen for pain control and nitrofurantoin for her infection.  Urine culture pending.     I have reviewed the nursing notes.    I have reviewed the findings, diagnosis, plan and need for follow up with the patient.       New Prescriptions    ACETAMINOPHEN (TYLENOL) 500 MG TABLET    Take 2 tablets (1,000 mg) by mouth every 8 hours as needed for fever or pain    IBUPROFEN (ADVIL/MOTRIN) 200 MG TABLET    Take 2-3 tablets (400-600 mg) by mouth every 8 hours as needed for mild pain    NITROFURANTOIN MACROCRYSTAL-MONOHYDRATE (MACROBID) 100 MG CAPSULE    Take 1 capsule (100 mg) by mouth 2 times daily for 5 days    PHENAZOPYRIDINE (PYRIDIUM) 200 MG TABLET    Take 1 tablet (200 mg) by mouth 3 times daily for 3 days       Final diagnoses:   UTI (urinary tract infection), bacterial       3/19/2022   [facility] EMERGENCY DEPT     Patton, Jean Barillas MD  03/19/22 [id number]

## 2022-03-21 LAB — BACTERIA UR CULT: ABNORMAL

## 2025-01-20 ENCOUNTER — NURSE TRIAGE (OUTPATIENT)
Dept: NURSING | Facility: CLINIC | Age: 30
End: 2025-01-20
Payer: COMMERCIAL

## 2025-01-20 ENCOUNTER — HOSPITAL ENCOUNTER (EMERGENCY)
Facility: CLINIC | Age: 30
Discharge: HOME | End: 2025-01-20
Attending: FAMILY MEDICINE | Admitting: FAMILY MEDICINE
Payer: COMMERCIAL

## 2025-01-20 VITALS
DIASTOLIC BLOOD PRESSURE: 78 MMHG | HEIGHT: 68 IN | BODY MASS INDEX: 21.22 KG/M2 | SYSTOLIC BLOOD PRESSURE: 117 MMHG | RESPIRATION RATE: 18 BRPM | TEMPERATURE: 98.1 F | OXYGEN SATURATION: 98 % | WEIGHT: 140 LBS | HEART RATE: 68 BPM

## 2025-01-20 DIAGNOSIS — F10.129 ALCOHOL ABUSE WITH INTOXICATION: ICD-10-CM

## 2025-01-20 PROBLEM — F10.10 ALCOHOL ABUSE: Status: ACTIVE | Noted: 2021-08-07

## 2025-01-20 PROBLEM — F32.A ANXIETY AND DEPRESSION: Status: ACTIVE | Noted: 2021-08-07

## 2025-01-20 PROBLEM — F41.9 ANXIETY AND DEPRESSION: Status: ACTIVE | Noted: 2021-08-07

## 2025-01-20 LAB
ALBUMIN SERPL BCG-MCNC: 4.6 G/DL (ref 3.5–5.2)
ALBUMIN UR-MCNC: NEGATIVE MG/DL
ALCOHOL BREATH TEST: 0.13 (ref 0–0.01)
ALP SERPL-CCNC: 66 U/L (ref 40–150)
ALT SERPL W P-5'-P-CCNC: 15 U/L (ref 0–50)
AMPHETAMINES UR QL SCN: ABNORMAL
ANION GAP SERPL CALCULATED.3IONS-SCNC: 14 MMOL/L (ref 7–15)
APPEARANCE UR: ABNORMAL
AST SERPL W P-5'-P-CCNC: 20 U/L (ref 0–45)
BARBITURATES UR QL SCN: ABNORMAL
BASOPHILS # BLD AUTO: 0.1 10E3/UL (ref 0–0.2)
BASOPHILS NFR BLD AUTO: 0 %
BENZODIAZ UR QL SCN: ABNORMAL
BILIRUB SERPL-MCNC: <0.2 MG/DL
BILIRUB UR QL STRIP: NEGATIVE
BUN SERPL-MCNC: 8.5 MG/DL (ref 6–20)
BZE UR QL SCN: ABNORMAL
CALCIUM SERPL-MCNC: 8.9 MG/DL (ref 8.8–10.4)
CANNABINOIDS UR QL SCN: ABNORMAL
CHLORIDE SERPL-SCNC: 107 MMOL/L (ref 98–107)
COLOR UR AUTO: ABNORMAL
CREAT SERPL-MCNC: 0.58 MG/DL (ref 0.51–0.95)
EGFRCR SERPLBLD CKD-EPI 2021: >90 ML/MIN/1.73M2
EOSINOPHIL # BLD AUTO: 0.1 10E3/UL (ref 0–0.7)
EOSINOPHIL NFR BLD AUTO: 1 %
ERYTHROCYTE [DISTWIDTH] IN BLOOD BY AUTOMATED COUNT: 11.3 % (ref 10–15)
ETHANOL SERPL-MCNC: 0.23 G/DL
FENTANYL UR QL: ABNORMAL
GLUCOSE SERPL-MCNC: 100 MG/DL (ref 70–99)
GLUCOSE UR STRIP-MCNC: NEGATIVE MG/DL
HCG SERPL QL: NEGATIVE
HCO3 SERPL-SCNC: 22 MMOL/L (ref 22–29)
HCT VFR BLD AUTO: 39.9 % (ref 35–47)
HGB BLD-MCNC: 13.8 G/DL (ref 11.7–15.7)
HGB UR QL STRIP: NEGATIVE
IMM GRANULOCYTES # BLD: 0.1 10E3/UL
IMM GRANULOCYTES NFR BLD: 1 %
KETONES UR STRIP-MCNC: NEGATIVE MG/DL
LEUKOCYTE ESTERASE UR QL STRIP: NEGATIVE
LIPASE SERPL-CCNC: 25 U/L (ref 13–60)
LITHIUM SERPL-SCNC: 0.3 MMOL/L (ref 0.6–1.2)
LYMPHOCYTES # BLD AUTO: 1.9 10E3/UL (ref 0.8–5.3)
LYMPHOCYTES NFR BLD AUTO: 16 %
MCH RBC QN AUTO: 31.7 PG (ref 26.5–33)
MCHC RBC AUTO-ENTMCNC: 34.6 G/DL (ref 31.5–36.5)
MCV RBC AUTO: 92 FL (ref 78–100)
MONOCYTES # BLD AUTO: 0.6 10E3/UL (ref 0–1.3)
MONOCYTES NFR BLD AUTO: 5 %
MUCOUS THREADS #/AREA URNS LPF: PRESENT /LPF
NEUTROPHILS # BLD AUTO: 9.1 10E3/UL (ref 1.6–8.3)
NEUTROPHILS NFR BLD AUTO: 77 %
NITRATE UR QL: NEGATIVE
NRBC # BLD AUTO: 0 10E3/UL
NRBC BLD AUTO-RTO: 0 /100
OPIATES UR QL SCN: ABNORMAL
PCP QUAL URINE (ROCHE): ABNORMAL
PH UR STRIP: 5.5 [PH] (ref 5–7)
PLATELET # BLD AUTO: 382 10E3/UL (ref 150–450)
POTASSIUM SERPL-SCNC: 4.1 MMOL/L (ref 3.4–5.3)
PROT SERPL-MCNC: 7.4 G/DL (ref 6.4–8.3)
RBC # BLD AUTO: 4.35 10E6/UL (ref 3.8–5.2)
RBC URINE: 0 /HPF
SODIUM SERPL-SCNC: 143 MMOL/L (ref 135–145)
SP GR UR STRIP: 1.02 (ref 1–1.03)
SQUAMOUS EPITHELIAL: <1 /HPF
UROBILINOGEN UR STRIP-MCNC: NORMAL MG/DL
WBC # BLD AUTO: 11.8 10E3/UL (ref 4–11)
WBC URINE: 2 /HPF

## 2025-01-20 PROCEDURE — 82075 ASSAY OF BREATH ETHANOL: CPT | Performed by: FAMILY MEDICINE

## 2025-01-20 PROCEDURE — 84703 CHORIONIC GONADOTROPIN ASSAY: CPT | Performed by: FAMILY MEDICINE

## 2025-01-20 PROCEDURE — 99284 EMERGENCY DEPT VISIT MOD MDM: CPT | Performed by: FAMILY MEDICINE

## 2025-01-20 PROCEDURE — 250N000011 HC RX IP 250 OP 636: Performed by: FAMILY MEDICINE

## 2025-01-20 PROCEDURE — 83690 ASSAY OF LIPASE: CPT | Performed by: FAMILY MEDICINE

## 2025-01-20 PROCEDURE — 80307 DRUG TEST PRSMV CHEM ANLYZR: CPT | Performed by: FAMILY MEDICINE

## 2025-01-20 PROCEDURE — 36415 COLL VENOUS BLD VENIPUNCTURE: CPT | Performed by: FAMILY MEDICINE

## 2025-01-20 PROCEDURE — 80053 COMPREHEN METABOLIC PANEL: CPT | Performed by: FAMILY MEDICINE

## 2025-01-20 PROCEDURE — 96360 HYDRATION IV INFUSION INIT: CPT | Performed by: FAMILY MEDICINE

## 2025-01-20 PROCEDURE — 80178 ASSAY OF LITHIUM: CPT | Performed by: FAMILY MEDICINE

## 2025-01-20 PROCEDURE — 99283 EMERGENCY DEPT VISIT LOW MDM: CPT | Performed by: FAMILY MEDICINE

## 2025-01-20 PROCEDURE — 82077 ASSAY SPEC XCP UR&BREATH IA: CPT | Performed by: FAMILY MEDICINE

## 2025-01-20 PROCEDURE — 85004 AUTOMATED DIFF WBC COUNT: CPT | Performed by: FAMILY MEDICINE

## 2025-01-20 PROCEDURE — 81001 URINALYSIS AUTO W/SCOPE: CPT | Performed by: FAMILY MEDICINE

## 2025-01-20 RX ORDER — DEXTROSE, SODIUM CHLORIDE, SODIUM LACTATE, POTASSIUM CHLORIDE, AND CALCIUM CHLORIDE 5; .6; .31; .03; .02 G/100ML; G/100ML; G/100ML; G/100ML; G/100ML
1000 INJECTION, SOLUTION INTRAVENOUS ONCE
Status: COMPLETED | OUTPATIENT
Start: 2025-01-20 | End: 2025-01-20

## 2025-01-20 RX ADMIN — SODIUM CHLORIDE, SODIUM LACTATE, POTASSIUM CHLORIDE, CALCIUM CHLORIDE AND DEXTROSE MONOHYDRATE 1000 ML: 5; 600; 310; 30; 20 INJECTION, SOLUTION INTRAVENOUS at 20:58

## 2025-01-20 ASSESSMENT — ACTIVITIES OF DAILY LIVING (ADL)
ADLS_ACUITY_SCORE: 41

## 2025-01-20 ASSESSMENT — COLUMBIA-SUICIDE SEVERITY RATING SCALE - C-SSRS
2. HAVE YOU ACTUALLY HAD ANY THOUGHTS OF KILLING YOURSELF IN THE PAST MONTH?: NO
6. HAVE YOU EVER DONE ANYTHING, STARTED TO DO ANYTHING, OR PREPARED TO DO ANYTHING TO END YOUR LIFE?: NO
1. IN THE PAST MONTH, HAVE YOU WISHED YOU WERE DEAD OR WISHED YOU COULD GO TO SLEEP AND NOT WAKE UP?: NO

## 2025-01-21 NOTE — ED NOTES
Instructed patient to take the evening medication she brought to the hospital. Trazadone and PRN hydroxyzine held.

## 2025-01-21 NOTE — DISCHARGE INSTRUCTIONS
The best chance for maintaining sobriety is to attend AA groups every day and get a sponsor.  Continue to use of alcohol will lead to many different medical, emotional, and social problems and severely impair the quality of your life.  I recommend you resume the use of your naltrexone

## 2025-01-21 NOTE — PLAN OF CARE
Yolie Ware  January 20, 2025  Plan of Care Hand-off Note     Patient Recommended Care Path: discharge    Clinical Substantiation:  No acute safety concerns assessed. Pt identifies she has a therapy intake (previously scheduled) tomorrow. She is followed by psychiatry on an OP basis. She denies any interest in returning to [facility]. This writer acknowledges the heavy feelings experienced by pt and family in response to tonights incident. Pt to spend the night with her parents to allow for time and space - return to converse re plan moving forward, services to explore in coming days. All in agreement.    Goals for crisis stabilization:  abstain from etoh use    Next steps for Care Team:   discharge pt    Treatment Objectives Addressed:  rapport building, processing feelings, identifying an appropriate aftercare plan, assessing safety, identifying additional supports    Therapeutic Interventions:  Worked on relapse prevention planning (review of stressors, early warning signs, written plan to respond to signs, and rehearse plan)., Explored motivation for behavioral change., Completed behavior chain analysis.    Has a specific means been identified for suicidal.homicide actions: No    Patient coping skills attempted to reduce the crisis:  lack of    Collateral contact information:  Octaviano pt  - present for assessment    Legal Status:  estefanía Bella, [facility]

## 2025-01-21 NOTE — CONSULTS
"Diagnostic Evaluation Consultation  Crisis Assessment    Patient Name: Yolie Ware  Age:  29 year old  Legal Sex: female  Gender Identity: female  Pronouns:   Race: White  Ethnicity: Not  or   Language: English      Patient was assessed: Virtual: Cureeo   Crisis Assessment Start Date: 01/20/25  Crisis Assessment Start Time: 2230  Crisis Assessment Stop Time: 2310  Patient location: [facility] EMERGENCY DEPT                                 Referral Data and Chief Complaint  Yolie Ware presents to the ED with family/friends. Patient is presenting to the ED for the following concerns: Intoxication, Substance use. Factors that make the mental health crisis life threatening or complex are: Pt arrives to the ED accompanied by her  and father. Per H&P \"She has a past history of severe alcohol abuse but had been sober for about 3 years.  She gives varying stories about when she relapsed.  She told her  she had 3 drinks today.  She told the nurse she started drinking a few months ago.  She told me it has been a few weeks.  She drinks hard liquor.  She tells me 4 drinks per day.  Her last drink was at 5:15 PM.  Her  has given her an ultimatum to go into inpatient treatment or he will leave her.  Their concern today is that she is not able to take her medications for anxiety and depression and that she will therefore decompensate\". To this writer pt reports 3 years of sobriety. Prior to this she identifies having struggled with etoh for approx 2 years. Pt reports she consumed alcohol through her pregnancy - attended 5+ KRISSY tx, IP and OP to get sober. Pt recently completed an IOP program for depression. She identifies a struggle to sleep for the past approx 1 week, noting only to get a few hours of sleep per night. Today, pt  was off work - the plan was to spend the day as a family to celebrate their son's 3rd birthday. Pt reportedly told her  she to pick " up a rx from the pharmacy - left the home and went to purchase etoh instead. Pt is tearful as she anticipates potential repercussions of her bx - noting specifically worry that her  will leave her.      Informed Consent and Assessment Methods  Explained the crisis assessment process, including applicable information disclosures and limits to confidentiality, assessed understanding of the process, and obtained consent to proceed with the assessment.  Assessment methods included conducting a formal interview with patient, review of medical records, collaboration with medical staff, and obtaining relevant collateral information from family and community providers when available.  : done     History of the Crisis   To this writer pt reports 3 years of sobriety. Prior to this she identifies having struggled with etoh for approx 2 years. Pt reports she consumed alcohol through her pregnancy - attended 5+ KRISSY tx, IP and OP to get sober.    Brief Psychosocial History  Family:  , Children yes (4yo son)  Support System:  , Parent(s)  Employment Status:  employed part-time (works 1 day a week at a )  Source of Income:  salary/wages  Financial Environmental Concerns:     Current Hobbies:  family functions  Barriers in Personal Life:  mental health concerns    Significant Clinical History  Current Anxiety Symptoms:  anxious, excessive worry  Current Depression/Trauma:  crying or feels like crying, sadness, impaired decision making, helplessness  Current Somatic Symptoms:  anxious, excessive worry  Current Psychosis/Thought Disturbance:     Current Eating Symptoms:     Chemical Use History:  Alcohol: Binge  Last Use:: 01/20/25  Benzodiazepines: None  Opiates: None  Cocaine: None  Marijuana: Occasional  Other Use: None   Past diagnosis:  Depression, Anxiety Disorder, Substance Use Disorder  Family history:  No known history of mental health or chemical health concerns  Past treatment:  Individual  therapy, Psychiatric Medication Management, Primary Care  Details of most recent treatment:  Pt recently completed IOP for depression through [facility].  Other relevant history:  Hx of multiple KRISSY tx.    Have there been any medication changes in the past two weeks:  no       Is the patient compliant with medications:  yes        Collateral Information  Is there collateral information: Yes     Collateral information name, relationship, phone number:  he Brumfield  - present for assessment    What happened today: Pt said she needed to  a rx from the pharmacy. Returned home smelling of etoh - could barely walk, speech slurred - called nurse triage line who encouraged ED eval.     What is different about patient's functioning: Pt has been sober for 3 years.     What do you think the patient needs:  IP KRISSY tx    Has patient made comments about wanting to kill themselves/others: no    If d/c is recommended, can they take part in safety/aftercare planning:  yes    Additional collateral information:  Told pt during her last struggled with etoh 3 years ago prior to sobriety - should she return to use, he would leave the relationship.     Risk Assessment  [place] Suicide Severity Rating Scale Full Clinical Version:  Suicidal Ideation  Q6 Suicide Behavior (Lifetime): no          [place] Suicide Severity Rating Scale Recent:   Suicidal Ideation (Recent)  Q1 Wished to be Dead (Past Month): no  Q2 Suicidal Thoughts (Past Month): no  Level of Risk per Screen: no risks indicated          Environmental or Psychosocial Events: ongoing abuse of substances  Protective Factors: Protective Factors: responsibilities and duties to others, including pets and children, lives in a responsibly safe and stable environment, able to access care without barriers    Does the patient have thoughts of harming others? Feels Like Hurting Others: no  Previous Attempt to Hurt Others: no  Is the patient engaging in sexually  inappropriate behavior?: no  Does Patient have a known history of aggressive behavior: No  Has aggression occurred as a result of  concerns/diagnosis: no  Does patient have history of aggression in hospital: no    Is the patient engaging in sexually inappropriate behavior?  no        Mental Status Exam   Affect: Other, Appropriate (tearful)  Appearance: Appropriate  Attention Span/Concentration: Attentive  Eye Contact: Variable    Fund of Knowledge: Appropriate   Language /Speech Content: Fluent  Language /Speech Volume: Normal  Language /Speech Rate/Productions: Normal  Recent Memory: Intact  Remote Memory: Intact  Mood: Sad  Orientation to Person: Yes   Orientation to Place: Yes  Orientation to Time of Day: Yes  Orientation to Date: Yes     Situation (Do they understand why they are here?): Yes  Psychomotor Behavior: Normal  Thought Content: Clear  Thought Form: Intact     Mini-Cog Assessment  Number of Words Recalled:    Clock-Drawing Test:     Three Item Recall:    Mini-Cog Total Score:       Medication  Psychotropic medications:   Medication Orders - Psychiatric (From admission, onward)      None             Current Care Team  Patient Care Team:  Sera Maier PCP - General    Diagnosis  Patient Active Problem List   Diagnosis Code    Alcohol abuse F10.10    Anxiety and depression F41.9, F32.A    15 weeks gestation of pregnancy Z3A.15       Primary Problem This Admission  Alcohol Use unspecified F10.90   Unspecified Depression F32.9  Unspecified Anxiety F41.9    Clinical Summary and Substantiation of Recommendations   Clinical Substantiation:  No acute safety concerns assessed. Pt identifies she has a therapy intake (previously scheduled) tomorrow. She is followed by psychiatry on an OP basis. She denies any interest in returning to KRISSY tx. This writer acknowledges the heavy feelings experienced by pt and family in response to tonights incident. Pt to spend the night with her parents to allow for time  and space - return to converse re plan moving forward, services to explore in coming days. All in agreement.    Goals for crisis stabilization:  abstain from etoh use    Next steps for Care Team:   discharge pt    Treatment Objectives Addressed:  rapport building, processing feelings, identifying an appropriate aftercare plan, assessing safety, identifying additional supports    Therapeutic Interventions:  Worked on relapse prevention planning (review of stressors, early warning signs, written plan to respond to signs, and rehearse plan)., Explored motivation for behavioral change., Completed behavior chain analysis.    Has a specific means been identified for suicidal/homicide actions: No    Patient coping skills attempted to reduce the crisis:  lack of    Disposition  Recommended referrals: Individual Therapy, Medication Management        Reviewed case and recommendations with attending provider. Attending Name: Jonathan MACHADO       Attending concurs with disposition: yes       Patient and/or validated legal guardian concurs with disposition:   yes       Final disposition:  discharge    Legal status:  vol                            Assessment Details   Total duration spent with the patient: 40 min     CPT code(s) utilized: 27206 - Psychotherapy for Crisis - 60 (30-74*) min    Christy Bella [facility], Psychotherapist  [facility] - Triage & Transition Services  Callback: [phone]

## 2025-01-21 NOTE — ED PROVIDER NOTES
History     Chief Complaint   Patient presents with    Alcohol Intoxication     HPI    Yolie Ware is a 29 year old female who comes in from home with her  and her father with alcohol intoxication.  She has a past history of severe alcohol abuse but had been sober for about 3 years.  She gives varying stories about when she relapsed.  She told her  she had 3 drinks today.  She told the nurse she started drinking a few months ago.  She told me it has been a few weeks.  She drinks hard liquor.  She tells me 4 drinks per day.  Her last drink was at 5:15 PM.  Her  has given her an ultimatum to go into inpatient treatment or he will leave her.  Their concern today is that she is not able to take her medications for anxiety and depression and that she will therefore decompensate.  The patient tells me she feels safe at home and that she does not have a plan to harm herself.  She thinks that what she needs to do is resume AA groups and go back into treatment.  She tells me she has been taking all of her medications regimen only.  Her medications as far as I can tell through care everywhere from [facility] include lithium, lamotrigine, venlafaxine, hydroxyzine, lurasidone.  She stopped taking disulfiram.  She was recently ill with an upper respiratory infection with cough and congestion from her 2-year-old.  She is not currently short of breath or having fevers or chest pain.    Allergies:  No Known Allergies    Problem List:    Patient Active Problem List    Diagnosis Date Noted    Alcohol abuse 08/07/2021     Priority: Medium    Anxiety and depression 08/07/2021     Priority: Medium    15 weeks gestation of pregnancy 08/07/2021     Priority: Medium        Past Medical History:    No past medical history on file.    Past Surgical History:    No past surgical history on file.    Family History:    No family history on file.    Social History:  Marital Status:   [2]  Social  "History     Tobacco Use    Smoking status: Never    Smokeless tobacco: Never   Substance Use Topics    Alcohol use: Yes     Comment: rarely    Drug use: Not Currently        Medications:    hydrOXYzine (ATARAX) 10 MG tablet  ondansetron (ZOFRAN-ODT) 4 MG ODT tab  Prenatal Vit-Fe Fumarate-FA (PRENATAL MULTIVITAMIN W/IRON) 27-0.8 MG tablet      Review of Systems    All other systems are reviewed and are negative    Physical Exam   BP: (!) 134/97  Pulse: 74  Temp: 98.1  F (36.7  C)  Resp: 18  Height: 172.7 cm (5' 8\")  Weight: 63.5 kg (140 lb)  SpO2: 99 %      Physical Exam    Nursing note and vitals were reviewed.  Constitutional: Awake and alert, adequately nourished and developed appearing 29-year-old in no apparent discomfort, who does not appear acutely ill, but does appear intoxicated with mildly slurred speech and inattention although she is able to answer questions and cooperate with examination.  HEENT: Atraumatic and normocephalic.  PERRL.  EOMI.   Neck: Freely mobile.  Cardiovascular: Cardiac examination reveals normal heart rate and regular rhythm without murmur.  Pulmonary/Chest: Breathing is unlabored.  Breath sounds are clear and equal bilaterally.  There no retractions, tachypnea, rales, wheezes, or rhonchi.  Abdomen: Soft, nontender, no HSM or masses rebound or guarding.  Musculoskeletal: Extremities are warm and well-perfused and without edema  Neurological: Alert, oriented, thought content logical, coherent   Skin: Warm, dry, no rashes.  Psychiatric: Affect congruent with acute intoxication    ED Course        Procedures             Critical Care time:  none         Results for orders placed or performed during the hospital encounter of 01/20/25 (from the past 24 hours)   CBC with platelets differential    Narrative    The following orders were created for panel order CBC with platelets differential.  Procedure                               Abnormality         Status                     ---------      "                          -----------         ------                     CBC with platelets and d...[[id number]]  Abnormal            Final result                 Please view results for these tests on the individual orders.   Comprehensive metabolic panel   Result Value Ref Range    Sodium 143 135 - 145 mmol/L    Potassium 4.1 3.4 - 5.3 mmol/L    Carbon Dioxide (CO2) 22 22 - 29 mmol/L    Anion Gap 14 7 - 15 mmol/L    Urea Nitrogen 8.5 6.0 - 20.0 mg/dL    Creatinine 0.58 0.51 - 0.95 mg/dL    GFR Estimate >90 >60 mL/min/1.73m2    Calcium 8.9 8.8 - 10.4 mg/dL    Chloride 107 98 - 107 mmol/L    Glucose 100 (H) 70 - 99 mg/dL    Alkaline Phosphatase 66 40 - 150 U/L    AST 20 0 - 45 U/L    ALT 15 0 - 50 U/L    Protein Total 7.4 6.4 - 8.3 g/dL    Albumin 4.6 3.5 - 5.2 g/dL    Bilirubin Total <0.2 <=1.2 mg/dL   Lipase   Result Value Ref Range    Lipase 25 13 - 60 U/L   Ethyl Alcohol Level   Result Value Ref Range    Alcohol ethyl 0.23 (H) <=0.01 g/dL   HCG qualitative Blood   Result Value Ref Range    hCG Serum Qualitative Negative Negative   Urine Macroscopic with reflex to Microscopic   Result Value Ref Range    Color Urine Light Yellow Colorless, Straw, Light Yellow, Yellow    Appearance Urine Slightly Cloudy (A) Clear    Glucose Urine Negative Negative mg/dL    Bilirubin Urine Negative Negative    Ketones Urine Negative Negative mg/dL    Specific Gravity Urine 1.018 1.003 - 1.035    Blood Urine Negative Negative    pH Urine 5.5 5.0 - 7.0    Protein Albumin Urine Negative Negative mg/dL    Urobilinogen Urine Normal Normal, 2.0 mg/dL    Nitrite Urine Negative Negative    Leukocyte Esterase Urine Negative Negative    RBC Urine 0 <=2 /HPF    WBC Urine 2 <=5 /HPF    Squamous Epithelials Urine <1 <=1 /HPF    Mucus Urine Present (A) None Seen /LPF   Urine Drug Screen    Narrative    The following orders were created for panel order Urine Drug Screen.  Procedure                               Abnormality         Status                      ---------                               -----------         ------                     Urine Drug Screen Panel[[id number]]      Abnormal            Final result                 Please view results for these tests on the individual orders.   Lithium level   Result Value Ref Range    Lithium 0.30 (L) 0.60 - 1.20 mmol/L   CBC with platelets and differential   Result Value Ref Range    WBC Count 11.8 (H) 4.0 - 11.0 10e3/uL    RBC Count 4.35 3.80 - 5.20 10e6/uL    Hemoglobin 13.8 11.7 - 15.7 g/dL    Hematocrit 39.9 35.0 - 47.0 %    MCV 92 78 - 100 fL    MCH 31.7 26.5 - 33.0 pg    MCHC 34.6 31.5 - 36.5 g/dL    RDW 11.3 10.0 - 15.0 %    Platelet Count 382 150 - 450 10e3/uL    % Neutrophils 77 %    % Lymphocytes 16 %    % Monocytes 5 %    % Eosinophils 1 %    % Basophils 0 %    % Immature Granulocytes 1 %    NRBCs per 100 WBC 0 <1 /100    Absolute Neutrophils 9.1 (H) 1.6 - 8.3 10e3/uL    Absolute Lymphocytes 1.9 0.8 - 5.3 10e3/uL    Absolute Monocytes 0.6 0.0 - 1.3 10e3/uL    Absolute Eosinophils 0.1 0.0 - 0.7 10e3/uL    Absolute Basophils 0.1 0.0 - 0.2 10e3/uL    Absolute Immature Granulocytes 0.1 <=0.4 10e3/uL    Absolute NRBCs 0.0 10e3/uL   Urine Drug Screen Panel   Result Value Ref Range    Amphetamines Urine Screen Negative Screen Negative    Barbituates Urine Screen Negative Screen Negative    Benzodiazepine Urine Screen Negative Screen Negative    Cannabinoids Urine Screen Positive (A) Screen Negative    Cocaine Urine Screen Negative Screen Negative    Fentanyl Qual Urine Screen Negative Screen Negative    Opiates Urine Screen Negative Screen Negative    PCP Urine Screen Negative Screen Negative   Alcohol breath test POCT   Result Value Ref Range    Alcohol Breath Test 0.13 (A) 0.00 - 0.01       Medications   dextrose 5% in lactated ringers infusion (0 mLs Intravenous Stopped 1/20/25 3056)     2010: Patient reassessed.  She is feeling well.  Vital signs are normal.  Test results reviewed.  These are all  reassuring with the exception of a blood alcohol level of 0.23.  Lithium level is subtherapeutic and not in the toxic range.  Assessments & Plan (with Medical Decision Making)     29-year-old female with a history of severe alcohol abuse presented intoxicated with her family.  Her  is quite angry with her relapse and has told her that she needs to go to inpatient treatment or he will leave.  Her physical examination was reassuring with no signs of a complication of her abuse and with uncomplicated intoxication.  Her laboratory studies were similarly reassuring.  After period of sobriety when she was clinically sober she spoke with our [facility] therapists.  She does not require emergency hospitalization and safe for discharge home.  She is able to contract for safety.  Her intention is to go stay with her parents and her  will go home and stay with their child.  When she is sober tomorrow they will have a discussion about the next steps.  I discussed with the patient that her best chance of maintaining sobriety is to get support in that endeavor by going to AA groups every day and getting a sponsor.  She can consider resuming use of her naltrexone as well.    I have reviewed the nursing notes.    I have reviewed the findings, diagnosis, plan and need for follow up with the patient.         New Prescriptions    No medications on file       Final diagnoses:   Alcohol abuse with intoxication       1/20/2025   [facility] EMERGENCY DEPT       Del Castillo MD  01/20/25 0197

## 2025-01-21 NOTE — TELEPHONE ENCOUNTER
" calling. He does not know his wife's PCP or clinic.   states that his wife was 3 yrs sober and has relapsed tonight and she is not taking any of her medications.  thinks \"she needs IV fluids for dehydration. She is on a lot of depression and anxiety medications and has not taken them\"   SX \"currently: drunk. Not vomiting, but she can hardly speak or walk, half passed out.\" Breathing OK--face is bright red. Not responding normally. Speaking very little. They live in [place], [place]--nearest hospital is in [place]. She last had alcohol to drink @ 5:15pm, usually takes medication @ [facility]. Prescribed by Dr Osman Chiang psychiatrist: Lamotriogine, Venlafaxine,   Lithium, not taking Disulfram or she would be sick, Lurasidone, Trazodone, Hydroxyzine\.  does not know which of the medications are current. .   Parents and  are currently with her.     Triaged to a disposition of Call 911 now,  states he will do this if he and his family are unable to transport her themselves.     Lo White RN Triage Nurse Advisor 6:50 PM 1/20/2025  Reason for Disposition   Alcohol use, abuse or dependence: question or problem related to   Difficult to awaken or acting confused (e.g., disoriented, slurred speech)    Additional Information   Negative: Coma (e.g., not moving, not talking, not responding to stimuli)   Negative: Seeing, hearing, or feeling things that are not there (i.e., visual, auditory, or tactile hallucinations)   Negative: Slow, shallow and weak breathing   Negative: Seizure   Negative: Violent behavior, or threatening to physically hurt or kill someone   Negative: Patient attempted suicide   Negative: Threatening suicide   Negative: Sounds like a life-threatening emergency to the triager   Negative: Recent significant injury, see that guideline (e.g., head, neck, chest, abdominal or extremity  injury)   Negative: Substance abuse or dependence: question or problem related to   " "Negative: Depression is main problem or symptom (e.g., feelings of sadness or hopelessness)   Negative: SEVERE abdominal pain   Negative: [1] Constant abdominal pain AND [2] present > 2 hours   Negative: Blood in bowel movements (e.g., black, tarry or red blood)  (Exception: Blood on surface of BM with constipation)   Negative: [1] Vomiting AND [2] contains red blood or black (\"coffee ground\") material  (Exception: few red streaks in vomit that only happened once)   Negative: [1] Vomiting or dry heaves AND [2] occurring frequently (i.e., vomiting > 4 times in last 4 hours)   Negative: Feeling very shaky (i.e., visible tremors of hands)   Negative: Patient sounds very sick or weak to the triager   Negative: Coma (e.g., not moving, not talking, not responding to stimuli)   Negative: Difficult to awaken or acting confused (e.g., disoriented, slurred speech)   Negative: Seeing, hearing, or feeling things that are not there (i.e., visual, auditory, or tactile hallucinations)   Negative: Slow, shallow and weak breathing   Negative: Seizure   Negative: Violent behavior, or threatening to physically hurt or kill someone   Negative: Sounds like a life-threatening emergency to the triager    Protocols used: Alcohol Abuse and [id number]-V-DL, Substance Abuse and Cjjeyfeyui-P-EK    "

## 2025-01-21 NOTE — ED TRIAGE NOTES
Patient is brought in today by spouse and  for concerns of excessive drinking tonight. Patient states she has a history of being an alcoholic and recently resumed drinking, estimates about 6 shots of vodka consumed this evening. Family is specifically concerned about patient being intoxicated and therefore not able to take her evening medications, concerned about how tomorrow will be if she missed evening dose.      Triage Assessment (Adult)       Row Name 01/20/25 2009          Triage Assessment    Airway WDL WDL        Respiratory WDL    Respiratory WDL WDL        Cardiac WDL    Cardiac WDL WDL        Peripheral/Neurovascular WDL    Peripheral Neurovascular WDL WDL        Cognitive/Neuro/Behavioral WDL    Cognitive/Neuro/Behavioral WDL WDL

## 2025-01-22 ENCOUNTER — TELEPHONE (OUTPATIENT)
Dept: BEHAVIORAL HEALTH | Facility: CLINIC | Age: 30
End: 2025-01-22
Payer: COMMERCIAL

## 2025-06-25 ENCOUNTER — HOSPITAL ENCOUNTER (EMERGENCY)
Facility: CLINIC | Age: 30
Discharge: HOME | End: 2025-06-25
Payer: COMMERCIAL

## 2025-06-25 VITALS
RESPIRATION RATE: 16 BRPM | SYSTOLIC BLOOD PRESSURE: 110 MMHG | OXYGEN SATURATION: 97 % | TEMPERATURE: 98.6 F | DIASTOLIC BLOOD PRESSURE: 71 MMHG | HEART RATE: 57 BPM

## 2025-06-25 DIAGNOSIS — R30.0 DYSURIA: ICD-10-CM

## 2025-06-25 DIAGNOSIS — N89.8 VAGINAL DISCHARGE: ICD-10-CM

## 2025-06-25 LAB
ALBUMIN UR-MCNC: NEGATIVE MG/DL
APPEARANCE UR: CLEAR
BILIRUB UR QL STRIP: NEGATIVE
CLUE CELLS: NORMAL
COLOR UR AUTO: ABNORMAL
GLUCOSE UR STRIP-MCNC: NEGATIVE MG/DL
HGB UR QL STRIP: NEGATIVE
KETONES UR STRIP-MCNC: NEGATIVE MG/DL
LEUKOCYTE ESTERASE UR QL STRIP: NEGATIVE
NITRATE UR QL: NEGATIVE
PH UR STRIP: 7.5 [PH] (ref 5–7)
RBC URINE: 0 /HPF
SP GR UR STRIP: 1.01 (ref 1–1.03)
TRICHOMONAS, WET PREP: NORMAL
UROBILINOGEN UR STRIP-MCNC: NORMAL MG/DL
WBC URINE: <1 /HPF
WBC'S/HIGH POWER FIELD, WET PREP: NORMAL
YEAST, WET PREP: NORMAL

## 2025-06-25 PROCEDURE — 81003 URINALYSIS AUTO W/O SCOPE: CPT | Performed by: PHYSICIAN ASSISTANT

## 2025-06-25 PROCEDURE — 99213 OFFICE O/P EST LOW 20 MIN: CPT

## 2025-06-25 PROCEDURE — G0463 HOSPITAL OUTPT CLINIC VISIT: HCPCS

## 2025-06-25 PROCEDURE — 87210 SMEAR WET MOUNT SALINE/INK: CPT

## 2025-06-25 ASSESSMENT — ACTIVITIES OF DAILY LIVING (ADL): ADLS_ACUITY_SCORE: 41

## 2025-06-26 NOTE — ED PROVIDER NOTES
Chief Complaint:   No chief complaint on file.       HPI:   Yolie Ware is a 29 year old female who presents to the urgent care with dysuria and a sense of incomplete bladder emptying that she first noticed this morning.  Prior to this, also notes a change in her vaginal discharge.  Took an at home urine test which indicated that she may have an infection.  She denies long duration, rigors, flank pain, temperature > 101 degrees F., and Vomiting, significant nausea or diarrhea.  Patient has history of occ UTIs.  Denies concern for pregnancy or STIs.    Medications:   Current Outpatient Medications   Medication Sig Dispense Refill    hydrOXYzine (ATARAX) 10 MG tablet Take 10 mg by mouth 2 times daily as needed for anxiety       ondansetron (ZOFRAN-ODT) 4 MG ODT tab Take 4 mg by mouth every 8 hours as needed for nausea      Prenatal Vit-Fe Fumarate-FA (PRENATAL MULTIVITAMIN W/IRON) 27-0.8 MG tablet Take 1 tablet by mouth daily         Allergies:   Allergies   Allergen Reactions    Propranolol      Blindness- per patient       Medications updated and reviewed.  Past, family and surgical history is updated and reviewed in the record.     Review of Systems:  Abdomen: see HPI  : see HPI  Back: see HPI  Immune: see HPI    Physical Exam:   /71 (BP Location: Left arm)   Pulse 57   Temp 98.6  F (37  C) (Tympanic)   Resp 16   SpO2 97%    General: healthy, alert, and no distress  Head: negative, Normocephalic.  Lungs: normal effort of breathing without signs of respiratory distress  Heart: regular rate  Abdomen: Abdomen soft, non-tender. No guarding or rebound. BS normal. No masses.  : not performed  Back/Spine: Back symmetric, no curvature. ROM normal. No CVA tenderness.    Laboratory Data:   Recent Results (from the past 24 hours)   UA with Microscopic reflex to Culture    Specimen: Urine, Clean Catch   Result Value Ref Range    Color Urine Straw Colorless, Straw, Light Yellow, Yellow    Appearance Urine  Clear Clear    Glucose Urine Negative Negative mg/dL    Bilirubin Urine Negative Negative    Ketones Urine Negative Negative mg/dL    Specific Gravity Urine 1.013 1.003 - 1.035    Blood Urine Negative Negative    pH Urine 7.5 (H) 5.0 - 7.0    Protein Albumin Urine Negative Negative mg/dL    Urobilinogen Urine Normal Normal mg/dL    Nitrite Urine Negative Negative    Leukocyte Esterase Urine Negative Negative    RBC Urine 0 <=2 /HPF    WBC Urine <1 <=5 /HPF    Narrative    Urine Culture not indicated   Wet prep    Specimen: Vagina; Swab   Result Value Ref Range    Trichomonas Absent Absent    Yeast Absent Absent    Clue Cells Absent Absent    WBCs/high power field None None       Assessment:  Dysuria  Vaginal discharge    Plan:   29-year-old female presents for evaluation of dysuria and a sense of incomplete bladder emptying that she first noticed this morning.  In addition noticed a change of vaginal discharge prior to this.  Declines pregnancy or STI testing today.    Patient well-appearing on arrival, afebrile.  Examination above overall reassuring.  Urinalysis obtained and with slightly elevated pH otherwise negative.  Wet prep negative for yeast, trichomonas or clue cells.  Supportive care is recommended to patient including increasing fluid intake.  Advised to follow-up with primary provider if symptoms are ongoing. Strict UC/ED return precautions were discussed in detail including persistent fevers, development of abdominal or flank pain, severe nausea or vomiting, weakness or anything else that is concerning to them.  All questions were answered.  Patient verbalized understanding and agreement with the above plan.    Condition on disposition: Stable    Disclaimer: This note consists of symbols derived from keyboarding, dictation, and/or voice recognition software. As a result, there may be errors in the script that have gone undetected.  Please consider this when interpreting information found in the  chart.         Rosa Rabago PA-C  06/25/25 1927

## 2025-06-26 NOTE — DISCHARGE INSTRUCTIONS
No UTI indicated on your urine test today.  I will contact you with the results of your wet prep.  Please return sooner if you develop abdominal or flank pain, severe nausea or start vomiting, fevers, weakness or anything else that is concerning to you.